# Patient Record
Sex: FEMALE | Race: WHITE | NOT HISPANIC OR LATINO | Employment: OTHER
[De-identification: names, ages, dates, MRNs, and addresses within clinical notes are randomized per-mention and may not be internally consistent; named-entity substitution may affect disease eponyms.]

---

## 2017-08-09 ENCOUNTER — LAB SERVICES (OUTPATIENT)
Dept: OTHER | Age: 67
End: 2017-08-09

## 2017-08-09 ENCOUNTER — HOSPITAL (OUTPATIENT)
Dept: OTHER | Age: 67
End: 2017-08-09
Attending: INTERNAL MEDICINE

## 2017-08-09 ENCOUNTER — IMAGING SERVICES (OUTPATIENT)
Dept: OTHER | Age: 67
End: 2017-08-09

## 2017-08-09 LAB
ALBUMIN SERPL-MCNC: 4.1 G/DL (ref 3.6–5.1)
ALBUMIN SERPL-MCNC: 4.1 GM/DL (ref 3.6–5.1)
ALP SERPL-CCNC: 84 UNIT/L (ref 45–117)
ALP SERPL-CCNC: 84 UNITS/L (ref 45–117)
ALT SERPL-CCNC: 23 UNIT/L
ALT SERPL-CCNC: 23 UNITS/L
AMORPH SED URNS QL MICRO: ABNORMAL
AMORPH SED URNS QL MICRO: ABNORMAL
ANALYZER ANC (IANC): ABNORMAL
ANALYZER ANC (IANC): ABNORMAL
ANION GAP SERPL CALC-SCNC: 14 MMOL/L (ref 10–20)
ANION GAP SERPL CALC-SCNC: 14 MMOL/L (ref 10–20)
APPEARANCE UR: ABNORMAL
APPEARANCE UR: ABNORMAL
AST SERPL-CCNC: 18 UNIT/L
AST SERPL-CCNC: 18 UNITS/L
BACTERIA #/AREA URNS HPF: ABNORMAL /HPF
BACTERIA #/AREA URNS HPF: ABNORMAL /HPF
BASOPHILS # BLD: 0 K/MCL (ref 0–0.3)
BASOPHILS # BLD: 0 THOUSAND/MCL (ref 0–0.3)
BASOPHILS NFR BLD: 0 %
BASOPHILS NFR BLD: 0 %
BILIRUB CONJ SERPL-MCNC: 0.1 MG/DL (ref 0–0.2)
BILIRUB CONJ SERPL-MCNC: 0.1 MG/DL (ref 0–0.2)
BILIRUB SERPL-MCNC: 0.5 MG/DL (ref 0.2–1)
BILIRUB SERPL-MCNC: 0.5 MG/DL (ref 0.2–1)
BILIRUB UR QL: NEGATIVE
BILIRUB UR QL: NEGATIVE
BUN SERPL-MCNC: 15 MG/DL (ref 6–20)
BUN SERPL-MCNC: 15 MG/DL (ref 6–20)
BUN/CREAT SERPL: 18 (ref 7–25)
BUN/CREAT SERPL: 18 (ref 7–25)
CALCIUM SERPL-MCNC: 9.2 MG/DL (ref 8.4–10.2)
CALCIUM SERPL-MCNC: 9.2 MG/DL (ref 8.4–10.2)
CAOX CRY URNS QL MICRO: ABNORMAL
CAOX CRY URNS QL MICRO: ABNORMAL
CHLORIDE SERPL-SCNC: 105 MMOL/L (ref 98–107)
CHLORIDE: 105 MMOL/L (ref 98–107)
CO2 SERPL-SCNC: 27 MMOL/L (ref 21–32)
CO2 SERPL-SCNC: 27 MMOL/L (ref 21–32)
COLOR UR: YELLOW
COLOR UR: YELLOW
CREAT SERPL-MCNC: 0.82 MG/DL (ref 0.51–0.95)
CREAT SERPL-MCNC: 0.82 MG/DL (ref 0.51–0.95)
DIFFERENTIAL METHOD BLD: ABNORMAL
DIFFERENTIAL METHOD BLD: ABNORMAL
EOSINOPHIL # BLD: 0 K/MCL (ref 0.1–0.5)
EOSINOPHIL # BLD: 0 THOUSAND/MCL (ref 0.1–0.5)
EOSINOPHIL NFR BLD: 0 %
EOSINOPHIL NFR BLD: 0 %
EPITH CASTS #/AREA URNS LPF: ABNORMAL
EPITH CASTS #/AREA URNS LPF: ABNORMAL /[LPF]
ERYTHROCYTE [DISTWIDTH] IN BLOOD: 12.5 % (ref 11–15)
ERYTHROCYTE [DISTWIDTH] IN BLOOD: 12.5 % (ref 11–15)
FATTY CASTS #/AREA URNS LPF: ABNORMAL
FATTY CASTS #/AREA URNS LPF: ABNORMAL /[LPF]
GLUCOSE SERPL-MCNC: 149 MG/DL (ref 65–99)
GLUCOSE SERPL-MCNC: 149 MG/DL (ref 65–99)
GLUCOSE UR-MCNC: NEGATIVE MG/DL
GLUCOSE UR-MCNC: NEGATIVE MG/DL
GRAN CASTS #/AREA URNS LPF: ABNORMAL
GRAN CASTS #/AREA URNS LPF: ABNORMAL /[LPF]
HEMATOCRIT: 41.5 % (ref 36–46.5)
HEMATOCRIT: 41.5 % (ref 36–46.5)
HEMOGLOBIN: 14.5 G/DL (ref 12–15.5)
HGB BLD-MCNC: 14.5 GM/DL (ref 12–15.5)
HGB UR QL: NEGATIVE
HYALINE CASTS #/AREA URNS LPF: ABNORMAL /LPF (ref 0–5)
HYALINE CASTS #/AREA URNS LPF: ABNORMAL /LPF (ref 0–5)
KETONES UR-MCNC: 80 MG/DL
KETONES UR-MCNC: 80 MG/DL
LEUKOCYTE ESTERASE UR QL STRIP: NEGATIVE
LIPASE SERPL-CCNC: 876 UNIT/L (ref 73–393)
LIPASE SERPL-CCNC: 876 UNITS/L (ref 73–393)
LYMPHOCYTES # BLD: 0.8 K/MCL (ref 1–4)
LYMPHOCYTES # BLD: 0.8 THOUSAND/MCL (ref 1–4)
LYMPHOCYTES NFR BLD: 9 %
LYMPHOCYTES NFR BLD: 9 %
MCH RBC QN AUTO: 31.3 PG (ref 26–34)
MCHC RBC AUTO-ENTMCNC: 34.9 GM/DL (ref 32–36.5)
MCV RBC AUTO: 89.4 FL (ref 78–100)
MEAN CORPUSCULAR HEMOGLOBIN: 31.3 PG (ref 26–34)
MEAN CORPUSCULAR HGB CONC: 34.9 G/DL (ref 32–36.5)
MEAN CORPUSCULAR VOLUME: 89.4 FL (ref 78–100)
MIXED CELL CASTS #/AREA URNS LPF: ABNORMAL
MIXED CELL CASTS #/AREA URNS LPF: ABNORMAL /[LPF]
MONOCYTES # BLD: 0.2 K/MCL (ref 0.3–0.9)
MONOCYTES # BLD: 0.2 THOUSAND/MCL (ref 0.3–0.9)
MONOCYTES NFR BLD: 3 %
MONOCYTES NFR BLD: 3 %
MUCOUS THREADS URNS QL MICRO: PRESENT
MUCOUS THREADS URNS QL MICRO: PRESENT
NEUTROPHILS # BLD: 8.2 K/MCL (ref 1.8–7.7)
NEUTROPHILS # BLD: 8.2 THOUSAND/MCL (ref 1.8–7.7)
NEUTROPHILS NFR BLD: 88 %
NEUTROPHILS NFR BLD: 88 %
NEUTS SEG NFR BLD: ABNORMAL
NEUTS SEG NFR BLD: ABNORMAL %
NITRITE UR QL: NEGATIVE
NITRITE UR QL: NEGATIVE
NRBC (NRBCRE): ABNORMAL
PERCENT NRBC: ABNORMAL
PH UR: 7 UNIT (ref 5–7)
PH UR: 7 UNITS (ref 5–7)
PLATELET # BLD: 180 THOUSAND/MCL (ref 140–450)
PLATELET COUNT: 180 K/MCL (ref 140–450)
POTASSIUM SERPL-SCNC: 3.9 MMOL/L (ref 3.4–5.1)
POTASSIUM SERPL-SCNC: 3.9 MMOL/L (ref 3.4–5.1)
PROT SERPL-MCNC: 7.2 GM/DL (ref 6.4–8.2)
PROT UR QL: NEGATIVE MG/DL
PROT UR QL: NEGATIVE MG/DL
RBC # BLD: 4.64 MILLION/MCL (ref 4–5.2)
RBC #/AREA URNS HPF: ABNORMAL /HPF (ref 0–3)
RBC #/AREA URNS HPF: ABNORMAL /HPF (ref 0–3)
RBC CASTS #/AREA URNS LPF: ABNORMAL
RBC CASTS #/AREA URNS LPF: ABNORMAL /[LPF]
RBC-URINE: NEGATIVE
RED CELL COUNT: 4.64 MIL/MCL (ref 4–5.2)
RENAL EPI CELLS #/AREA URNS HPF: ABNORMAL
RENAL EPI CELLS #/AREA URNS HPF: ABNORMAL /[HPF]
SODIUM SERPL-SCNC: 142 MMOL/L (ref 135–145)
SODIUM SERPL-SCNC: 142 MMOL/L (ref 135–145)
SP GR UR: 1.02 (ref 1–1.03)
SP GR UR: 1.02 (ref 1–1.03)
SPECIMEN SOURCE: ABNORMAL
SPECIMEN SOURCE: ABNORMAL
SPERM URNS QL MICRO: ABNORMAL
SPERM URNS QL MICRO: ABNORMAL
SQUAMOUS #/AREA URNS HPF: ABNORMAL /HPF (ref 0–5)
SQUAMOUS #/AREA URNS HPF: ABNORMAL /HPF (ref 0–5)
T VAGINALIS URNS QL MICRO: ABNORMAL
T VAGINALIS URNS QL MICRO: ABNORMAL
TOTAL PROTEIN: 7.2 G/DL (ref 6.4–8.2)
TRI-PHOS CRY URNS QL MICRO: ABNORMAL
TRI-PHOS CRY URNS QL MICRO: ABNORMAL
URATE CRY URNS QL MICRO: ABNORMAL
URATE CRY URNS QL MICRO: ABNORMAL
URINE REFLEX: ABNORMAL
URINE REFLEX: ABNORMAL
URNS CMNT MICRO: ABNORMAL
URNS CMNT MICRO: ABNORMAL
UROBILINOGEN UR QL: 0.2 MG/DL (ref 0–1)
UROBILINOGEN UR QL: 0.2 MG/DL (ref 0–1)
WAXY CASTS #/AREA URNS LPF: ABNORMAL
WAXY CASTS #/AREA URNS LPF: ABNORMAL /[LPF]
WBC # BLD: 9.2 THOUSAND/MCL (ref 4.2–11)
WBC #/AREA URNS HPF: ABNORMAL /HPF (ref 0–5)
WBC #/AREA URNS HPF: ABNORMAL /HPF (ref 0–5)
WBC CASTS #/AREA URNS LPF: ABNORMAL
WBC CASTS #/AREA URNS LPF: ABNORMAL /[LPF]
WBC-URINE: NEGATIVE
WHITE BLOOD COUNT: 9.2 K/MCL (ref 4.2–11)
YEAST HYPHAE URNS QL MICRO: ABNORMAL
YEAST HYPHAE URNS QL MICRO: ABNORMAL
YEAST URNS QL MICRO: ABNORMAL
YEAST URNS QL MICRO: ABNORMAL

## 2017-08-10 ENCOUNTER — IMAGING SERVICES (OUTPATIENT)
Dept: OTHER | Age: 67
End: 2017-08-10

## 2017-08-10 LAB
CHOLEST SERPL-MCNC: 195 MG/DL
CHOLEST/HDLC SERPL: 3.1 {RATIO}
HDLC SERPL-MCNC: 62 MG/DL
LDLC SERPL CALC-MCNC: 114 MG/DL
LIPASE SERPL-CCNC: 296 UNIT/L (ref 73–393)
NONHDLC SERPL-MCNC: 133 MG/DL
TRIGLYCERIDE (TRIGP): 96 MG/DL

## 2017-08-11 LAB
ALBUMIN SERPL-MCNC: 3.3 GM/DL (ref 3.6–5.1)
ALBUMIN/GLOB SERPL: 1.2 {RATIO} (ref 1–2.4)
ALP SERPL-CCNC: 65 UNIT/L (ref 45–117)
ALT SERPL-CCNC: 22 UNIT/L
ANION GAP SERPL CALC-SCNC: 14 MMOL/L (ref 10–20)
AST SERPL-CCNC: 16 UNIT/L
BILIRUB SERPL-MCNC: 0.5 MG/DL (ref 0.2–1)
BUN SERPL-MCNC: 7 MG/DL (ref 6–20)
BUN/CREAT SERPL: 9 (ref 7–25)
CALCIUM SERPL-MCNC: 8.4 MG/DL (ref 8.4–10.2)
CHLORIDE: 108 MMOL/L (ref 98–107)
CO2 SERPL-SCNC: 26 MMOL/L (ref 21–32)
CREAT SERPL-MCNC: 0.76 MG/DL (ref 0.51–0.95)
GLOBULIN SER-MCNC: 2.8 GM/DL (ref 2–4)
GLUCOSE SERPL-MCNC: 83 MG/DL (ref 65–99)
POTASSIUM SERPL-SCNC: 3.5 MMOL/L (ref 3.4–5.1)
PROT SERPL-MCNC: 6.1 GM/DL (ref 6.4–8.2)
SODIUM SERPL-SCNC: 144 MMOL/L (ref 135–145)

## 2017-08-29 ENCOUNTER — OFFICE VISIT (OUTPATIENT)
Dept: INTERNAL MEDICINE CLINIC | Facility: CLINIC | Age: 67
End: 2017-08-29

## 2017-08-29 VITALS
SYSTOLIC BLOOD PRESSURE: 131 MMHG | WEIGHT: 125 LBS | BODY MASS INDEX: 22.15 KG/M2 | HEIGHT: 63 IN | DIASTOLIC BLOOD PRESSURE: 77 MMHG | HEART RATE: 96 BPM

## 2017-08-29 DIAGNOSIS — K44.9 HIATAL HERNIA: Primary | ICD-10-CM

## 2017-08-29 DIAGNOSIS — K85.90 ACUTE PANCREATITIS, UNSPECIFIED COMPLICATION STATUS, UNSPECIFIED PANCREATITIS TYPE: ICD-10-CM

## 2017-08-29 PROBLEM — K21.9 GASTROESOPHAGEAL REFLUX DISEASE WITHOUT ESOPHAGITIS: Status: ACTIVE | Noted: 2017-08-29

## 2017-08-29 PROCEDURE — 99203 OFFICE O/P NEW LOW 30 MIN: CPT | Performed by: INTERNAL MEDICINE

## 2017-08-29 PROCEDURE — G0463 HOSPITAL OUTPT CLINIC VISIT: HCPCS | Performed by: INTERNAL MEDICINE

## 2017-08-29 RX ORDER — CITALOPRAM 10 MG/1
0.5 TABLET ORAL DAILY
Refills: 0 | COMMUNITY
Start: 2017-07-14 | End: 2017-08-29

## 2017-08-29 RX ORDER — CITALOPRAM 10 MG/1
TABLET ORAL
Qty: 90 TABLET | Refills: 1 | Status: SHIPPED | OUTPATIENT
Start: 2017-08-29 | End: 2018-05-31

## 2017-08-29 RX ORDER — CITALOPRAM 10 MG/1
5 TABLET ORAL DAILY
Qty: 90 TABLET | Refills: 1 | Status: SHIPPED | OUTPATIENT
Start: 2017-08-29 | End: 2017-08-29

## 2017-08-29 RX ORDER — FAMOTIDINE 40 MG/1
1 TABLET, FILM COATED ORAL 2 TIMES DAILY
Refills: 2 | COMMUNITY
Start: 2017-08-01 | End: 2018-05-22

## 2017-08-29 NOTE — PROGRESS NOTES
HPI:    Patient ID: Kimberly Segura is a 75year old female.   Presents with several concerns As a new patient    HPI  Patient reports that 2 weeks ago she was admitted to [facility] for treatment of recurrent vomiting testing done CT scan of Prescriptions:  famotidine 40 MG Oral Tab Take 1 tablet by mouth 2 (two) times daily.  Disp:  Rfl: 2   Citalopram Hydrobromide 10 MG Oral Tab Take 1 tab po daily Disp: 90 tablet Rfl: 1     Allergies:No Known Allergies   /77 (BP Location: Right arm, Pa of the defined types were placed in this encounter.       Meds This Visit:  Signed Prescriptions Disp Refills    Citalopram Hydrobromide 10 MG Oral Tab 90 tablet 1      Sig: Take 1 tab po daily           Imaging & Referrals:  [facility] - INTERNAL         ID#[id number]

## 2017-11-09 ENCOUNTER — TELEPHONE (OUTPATIENT)
Dept: GASTROENTEROLOGY | Facility: CLINIC | Age: 67
End: 2017-11-09

## 2017-11-09 ENCOUNTER — OFFICE VISIT (OUTPATIENT)
Dept: GASTROENTEROLOGY | Facility: CLINIC | Age: 67
End: 2017-11-09

## 2017-11-09 VITALS
SYSTOLIC BLOOD PRESSURE: 135 MMHG | DIASTOLIC BLOOD PRESSURE: 75 MMHG | WEIGHT: 136 LBS | HEIGHT: 62.75 IN | BODY MASS INDEX: 24.4 KG/M2 | HEART RATE: 83 BPM

## 2017-11-09 DIAGNOSIS — K85.00 IDIOPATHIC ACUTE PANCREATITIS WITHOUT INFECTION OR NECROSIS: ICD-10-CM

## 2017-11-09 DIAGNOSIS — K21.9 GASTROESOPHAGEAL REFLUX DISEASE WITHOUT ESOPHAGITIS: Primary | ICD-10-CM

## 2017-11-09 DIAGNOSIS — K85.00 IDIOPATHIC ACUTE PANCREATITIS, UNSPECIFIED COMPLICATION STATUS: Primary | ICD-10-CM

## 2017-11-09 PROCEDURE — G0463 HOSPITAL OUTPT CLINIC VISIT: HCPCS | Performed by: INTERNAL MEDICINE

## 2017-11-09 PROCEDURE — 99204 OFFICE O/P NEW MOD 45 MIN: CPT | Performed by: INTERNAL MEDICINE

## 2017-11-09 NOTE — TELEPHONE ENCOUNTER
GI RN staff: Please contact the patient to arrange an endoscopic ultrasound of the pancreas by Dr. Douglas Bermudez for an episode of unexplained pancreatitis. Micah Escobedo.

## 2017-11-09 NOTE — PROGRESS NOTES
HPI:    Patient ID: Dimple Jaquez is a 75year old female. HPI  The patient is seen today with her  at the request of Dr. Anca Boateng. She was formally under the GI care of Dr. Justine Wallace.   147 pages of medical records from [facility] dysphagia. She will occasionally awaken with nocturnal acid brash. She has chronic sinus congestion despite having seen ENT and having had a deviated septum repaired. She has throat discomfort as well. She has no current abdominal pain.   Her bowel move Effort normal and breath sounds normal. No respiratory distress. She has no wheezes. She has no rales. Abdominal: Soft. Bowel sounds are normal. She exhibits no distension and no mass. There is no hepatosplenomegaly. There is no tenderness.  There is no r evaluate for these entities. The patient is in agreement. This will be arranged with Dr. Nisha Serrano. 3. Colorectal cancer screening  The patient's last colonoscopy in 2016 was incomplete.   She should have either yearly FIT testing or FIT-DNA testing ev

## 2017-11-09 NOTE — PATIENT INSTRUCTIONS
1.  Continue dietary and lifestyle modification for reflux. 2.  May continue famotidine. 3.  I will have the office contact you to arrange an endoscopic ultrasound to investigate the recent episode of pancreatitis.   4.  You should have either yearly stoo

## 2017-11-09 NOTE — TELEPHONE ENCOUNTER
Noted. Thanks!     Blaise Neri MD  [facility], [facility] - Gastroenterology  11/9/2017  5:16 PM

## 2017-11-14 NOTE — TELEPHONE ENCOUNTER
Scheduled for:  EUS - 98628  Provider Name:  Dr. Jose Lux  Date:  1/16/18  Location:  [facility]  Sedation:  MAC  Time:  1:45 pm (pt is aware to arrive at 12:45 pm)  Prep:  NPO after midnight  Meds/Allergies Reconciled?:  Yes, Physician reviewed    Diagnosis wit

## 2017-11-24 NOTE — TELEPHONE ENCOUNTER
Records reviewed and were summarized in my office visit note. Pertinent findings:    1. CT scan on August 9, 2017 revealed slight peripancreatic stranding which may represent acute pancreatitis. Large hiatal hernia. Colonic diverticulosis.   2.  Tushar Ching

## 2018-01-15 RX ORDER — CITALOPRAM 10 MG/1
10 TABLET ORAL DAILY
COMMUNITY
End: 2018-02-26

## 2018-01-16 ENCOUNTER — SURGERY (OUTPATIENT)
Age: 68
End: 2018-01-16

## 2018-01-16 ENCOUNTER — TELEPHONE (OUTPATIENT)
Dept: GASTROENTEROLOGY | Facility: CLINIC | Age: 68
End: 2018-01-16

## 2018-01-16 ENCOUNTER — HOSPITAL ENCOUNTER (OUTPATIENT)
Facility: HOSPITAL | Age: 68
Setting detail: HOSPITAL OUTPATIENT SURGERY
Discharge: HOME | End: 2018-01-16
Attending: INTERNAL MEDICINE | Admitting: INTERNAL MEDICINE
Payer: MEDICARE

## 2018-01-16 ENCOUNTER — ANESTHESIA EVENT (OUTPATIENT)
Dept: ENDOSCOPY | Facility: HOSPITAL | Age: 68
End: 2018-01-16
Payer: MEDICARE

## 2018-01-16 ENCOUNTER — ANESTHESIA (OUTPATIENT)
Dept: ENDOSCOPY | Facility: HOSPITAL | Age: 68
End: 2018-01-16
Payer: MEDICARE

## 2018-01-16 DIAGNOSIS — K85.00 IDIOPATHIC ACUTE PANCREATITIS, UNSPECIFIED COMPLICATION STATUS: ICD-10-CM

## 2018-01-16 DIAGNOSIS — K44.9 HIATAL HERNIA: Primary | ICD-10-CM

## 2018-01-16 DIAGNOSIS — Z87.19 HISTORY OF PANCREATITIS: Primary | ICD-10-CM

## 2018-01-16 DIAGNOSIS — Q39.6 ESOPHAGEAL DIVERTICULUM: ICD-10-CM

## 2018-01-16 PROCEDURE — 0DJ08ZZ INSPECTION OF UPPER INTESTINAL TRACT, VIA NATURAL OR ARTIFICIAL OPENING ENDOSCOPIC: ICD-10-PCS | Performed by: INTERNAL MEDICINE

## 2018-01-16 PROCEDURE — 43237 ENDOSCOPIC US EXAM ESOPH: CPT | Performed by: INTERNAL MEDICINE

## 2018-01-16 RX ORDER — SODIUM CHLORIDE, SODIUM LACTATE, POTASSIUM CHLORIDE, CALCIUM CHLORIDE 600; 310; 30; 20 MG/100ML; MG/100ML; MG/100ML; MG/100ML
INJECTION, SOLUTION INTRAVENOUS CONTINUOUS
Status: DISCONTINUED | OUTPATIENT
Start: 2018-01-16 | End: 2018-01-16

## 2018-01-16 RX ORDER — NALOXONE HYDROCHLORIDE 0.4 MG/ML
80 INJECTION, SOLUTION INTRAMUSCULAR; INTRAVENOUS; SUBCUTANEOUS AS NEEDED
Status: DISCONTINUED | OUTPATIENT
Start: 2018-01-16 | End: 2018-01-16

## 2018-01-16 RX ORDER — LIDOCAINE HYDROCHLORIDE 10 MG/ML
INJECTION, SOLUTION EPIDURAL; INFILTRATION; INTRACAUDAL; PERINEURAL AS NEEDED
Status: DISCONTINUED | OUTPATIENT
Start: 2018-01-16 | End: 2018-01-16 | Stop reason: SURG

## 2018-01-16 RX ADMIN — SODIUM CHLORIDE, SODIUM LACTATE, POTASSIUM CHLORIDE, CALCIUM CHLORIDE: 600; 310; 30; 20 INJECTION, SOLUTION INTRAVENOUS at 13:51:00

## 2018-01-16 RX ADMIN — LIDOCAINE HYDROCHLORIDE 50 MG: 10 INJECTION, SOLUTION EPIDURAL; INFILTRATION; INTRACAUDAL; PERINEURAL at 13:55:00

## 2018-01-16 RX ADMIN — SODIUM CHLORIDE, SODIUM LACTATE, POTASSIUM CHLORIDE, CALCIUM CHLORIDE: 600; 310; 30; 20 INJECTION, SOLUTION INTRAVENOUS at 14:38:00

## 2018-01-16 NOTE — OPERATIVE REPORT
Esophagogastroduodenoscopy (EGD) & Endoscopic Ultrasound (EUS) Report    Cornelious Starch     3/21/1950 Age 75year old   PCP Andrea Light MD Endoscopist Lillie Leonard MD     Date of procedure: 18    Procedure: EGD and EUS esophagus, stomac normal. There was a small diverticulum at approximately 30 cm. There was some tortuosity of the esophagus. Otherwise, the esophageal mucosa appeared normal.  2. Stomach: The diaphragmatic pinch was located at 41 cm consistent with a 7 cm hiatal hernia.  The at [phone].        Carol Martinez MD  [facility], [facility] - Gastroenterology  1/16/2018  2:39 PM

## 2018-01-16 NOTE — TELEPHONE ENCOUNTER
Pt contacted and reviewed that based on her insurance, she does not require PA and may schedule her MRI by calling [phone], she verbalized understanding and will call them to arrange. No further questions at this time.

## 2018-01-16 NOTE — H&P
History & Physical Examination    Patient Name: Claudette Ales  MRN: [id number]  [facility]: [id number]  YOB: 1950    Diagnosis: history of pancreatitis August 2017 at [facility]      Prescriptions Prior to Admission:  Citalopram Hydrobromide 10 MG

## 2018-01-16 NOTE — TELEPHONE ENCOUNTER
Patient had EGD/EUS today for an episode of unexplained pancreatitis August 2017 at [facility] and recommended EUS which she had today though incomplete evaluation of the pancreas due to anatomy.     Discussed with Dr. Kristofer Joy an

## 2018-01-16 NOTE — ANESTHESIA PREPROCEDURE EVALUATION
Anesthesia PreOp Note    HPI:     Shavon Lala is a 75year old female who presents for preoperative consultation requested by: Lea Givens MD    Date of Surgery: 1/16/2018    Procedure(s):  ENDOSCOPIC ULTRASOUND (EUS)  [facility] labs reviewed. Vital Signs: Body mass index is 22.5 kg/m². height is 1.6 m (5' 3\") and weight is 57.6 kg (127 lb). Her blood pressure is 120/59 and her pulse is 84. Her respiration is 16 and oxygen saturation is 99%.     01/15/18  1896 01/16

## 2018-01-16 NOTE — ANESTHESIA POSTPROCEDURE EVALUATION
Patient: Shavon Main    Procedure Summary     Date:  01/16/18 Room / Location:  [facility] ENDOSCOPY 01 / [facility] ENDOSCOPY    Anesthesia Start:  9032 Anesthesia Stop:  4558    Procedures:       ENDOSCOPIC ULTRASOUND (EUS) (N/A )      ESOPHAGOGASTRODUODENOSCOPY

## 2018-01-17 VITALS
SYSTOLIC BLOOD PRESSURE: 137 MMHG | DIASTOLIC BLOOD PRESSURE: 74 MMHG | HEIGHT: 63 IN | RESPIRATION RATE: 15 BRPM | BODY MASS INDEX: 22.5 KG/M2 | OXYGEN SATURATION: 98 % | WEIGHT: 127 LBS | HEART RATE: 70 BPM

## 2018-01-18 NOTE — TELEPHONE ENCOUNTER
Future Appointments  Date Time Provider Rigo Alejandro   1/26/2018 1:30 PM [facility] MRI RM1 [facility] MRI EM [facility]

## 2018-01-26 ENCOUNTER — HOSPITAL ENCOUNTER (OUTPATIENT)
Dept: MRI IMAGING | Facility: HOSPITAL | Age: 68
Discharge: HOME | End: 2018-01-26
Attending: INTERNAL MEDICINE
Payer: MEDICARE

## 2018-01-26 DIAGNOSIS — Z87.19 HISTORY OF PANCREATITIS: ICD-10-CM

## 2018-01-26 LAB — CREAT BLD-MCNC: 0.8 MG/DL (ref 0.5–1.5)

## 2018-01-26 PROCEDURE — A9575 INJ GADOTERATE MEGLUMI 0.1ML: HCPCS | Performed by: INTERNAL MEDICINE

## 2018-01-26 PROCEDURE — 74183 MRI ABD W/O CNTR FLWD CNTR: CPT | Performed by: INTERNAL MEDICINE

## 2018-01-26 PROCEDURE — 76376 3D RENDER W/INTRP POSTPROCES: CPT | Performed by: INTERNAL MEDICINE

## 2018-01-26 PROCEDURE — 82565 ASSAY OF CREATININE: CPT

## 2018-01-29 ENCOUNTER — TELEPHONE (OUTPATIENT)
Dept: GASTROENTEROLOGY | Facility: CLINIC | Age: 68
End: 2018-01-29

## 2018-01-29 NOTE — TELEPHONE ENCOUNTER
Called patient and discussed with her results of MRI which was unremarkable.  Discussed that based on evaluation by Dr. Tonja Keen and EUS and now MRI her pancreatitis episode she had appears to be idiopathic without anatomical or other explanation identi

## 2018-02-26 RX ORDER — CITALOPRAM HYDROBROMIDE 10 MG/1
TABLET ORAL
Qty: 90 TABLET | Refills: 0 | Status: SHIPPED | OUTPATIENT
Start: 2018-02-26 | End: 2018-03-07

## 2018-03-07 ENCOUNTER — OFFICE VISIT (OUTPATIENT)
Dept: INTERNAL MEDICINE CLINIC | Facility: CLINIC | Age: 68
End: 2018-03-07

## 2018-03-07 VITALS
DIASTOLIC BLOOD PRESSURE: 72 MMHG | SYSTOLIC BLOOD PRESSURE: 135 MMHG | BODY MASS INDEX: 22.5 KG/M2 | WEIGHT: 127 LBS | HEIGHT: 63 IN | HEART RATE: 84 BPM | TEMPERATURE: 98 F

## 2018-03-07 DIAGNOSIS — Z12.31 BREAST CANCER SCREENING BY MAMMOGRAM: ICD-10-CM

## 2018-03-07 DIAGNOSIS — N28.1 KIDNEY CYST, ACQUIRED: ICD-10-CM

## 2018-03-07 DIAGNOSIS — K44.9 HIATAL HERNIA: Primary | ICD-10-CM

## 2018-03-07 DIAGNOSIS — F41.9 ANXIETY: ICD-10-CM

## 2018-03-07 PROCEDURE — G0463 HOSPITAL OUTPT CLINIC VISIT: HCPCS | Performed by: INTERNAL MEDICINE

## 2018-03-07 PROCEDURE — 99213 OFFICE O/P EST LOW 20 MIN: CPT | Performed by: INTERNAL MEDICINE

## 2018-03-07 NOTE — PROGRESS NOTES
HPI:    Patient ID: Shavon Lala is a 75year old female presents for follow-up on several medical conditions. HPI  Patient is here to follow-up on anxiety medication.   She has been taking citalopram 10 mg for about 10 years after the death of her tab po daily (Patient taking differently: 5 mg.  Take 1 tab po daily ) Disp: 90 tablet Rfl: 1     Allergies:  Seasonal                   /72 (BP Location: Right arm, Patient Position: Sitting, Cuff Size: adult)   Pulse 84   Temp 97.9 °F (36.6 °C) (Ora

## 2018-04-04 ENCOUNTER — HOSPITAL (OUTPATIENT)
Dept: OTHER | Age: 68
End: 2018-04-04
Attending: INTERNAL MEDICINE

## 2018-04-13 ENCOUNTER — TELEPHONE (OUTPATIENT)
Dept: INTERNAL MEDICINE CLINIC | Facility: CLINIC | Age: 68
End: 2018-04-13

## 2018-05-10 ENCOUNTER — TELEPHONE (OUTPATIENT)
Dept: INTERNAL MEDICINE CLINIC | Facility: CLINIC | Age: 68
End: 2018-05-10

## 2018-05-10 NOTE — TELEPHONE ENCOUNTER
Pt states has already had a colonoscopy within the year and she can get the results from [facility].

## 2018-05-16 NOTE — TELEPHONE ENCOUNTER
Spoke with pt.   Pt. States to contact Dr. Arthur Escobdeo to request colonoscopy report  At fax [phone],[facility] # [phone]

## 2018-05-21 NOTE — LETTER
06/26/25        Vijaya Shipley  [address]  [place] [address]        [facility],    This is the [facility], office of Erin Chao MD    Thank you for putting your trust in [facility].  Our goal is to deliver the highest quality healthcare and an exceptional patient experience.  Upon reviewing of your medical record shows you are due for the following:        Annual Medicare Physical  Dexa Scan  Blood pressure follow up          Please call [phone] to schedule your appointment or schedule online via [employer].    If you changed to a new provider at another facility, please notify the clinic to update your records.    If you had any recent testing at another facility, please have your results faxed to our office at [phone].      Thank you and have a great day!        
DISPLAY PLAN FREE TEXT

## 2018-05-22 NOTE — TELEPHONE ENCOUNTER
Pharmacy is calling to \"follow up\" on refill request. No communication entered regarding this medication - stated this was initially faxed on 05/17 and then 05/18. Please advise. famotidine 40 MG Oral Tab Take 1 tablet by mouth 2 (two) times daily.  Disp:  Rfl: 2

## 2018-05-23 RX ORDER — FAMOTIDINE 40 MG/1
40 TABLET, FILM COATED ORAL 2 TIMES DAILY
Qty: 180 TABLET | Refills: 0 | Status: SHIPPED | OUTPATIENT
Start: 2018-05-23 | End: 2018-08-22

## 2018-05-31 ENCOUNTER — TELEPHONE (OUTPATIENT)
Dept: OTHER | Age: 68
End: 2018-05-31

## 2018-05-31 ENCOUNTER — OFFICE VISIT (OUTPATIENT)
Dept: INTERNAL MEDICINE CLINIC | Facility: CLINIC | Age: 68
End: 2018-05-31

## 2018-05-31 VITALS
SYSTOLIC BLOOD PRESSURE: 136 MMHG | WEIGHT: 133 LBS | RESPIRATION RATE: 16 BRPM | BODY MASS INDEX: 23.57 KG/M2 | TEMPERATURE: 98 F | DIASTOLIC BLOOD PRESSURE: 80 MMHG | HEART RATE: 75 BPM | HEIGHT: 63 IN

## 2018-05-31 DIAGNOSIS — B02.9 HERPES ZOSTER WITHOUT COMPLICATION: Primary | ICD-10-CM

## 2018-05-31 PROCEDURE — G0463 HOSPITAL OUTPT CLINIC VISIT: HCPCS | Performed by: INTERNAL MEDICINE

## 2018-05-31 PROCEDURE — 99213 OFFICE O/P EST LOW 20 MIN: CPT | Performed by: INTERNAL MEDICINE

## 2018-05-31 RX ORDER — GABAPENTIN 100 MG/1
100 CAPSULE ORAL 2 TIMES DAILY PRN
Qty: 10 CAPSULE | Refills: 0 | Status: SHIPPED | OUTPATIENT
Start: 2018-05-31 | End: 2018-11-27

## 2018-05-31 NOTE — PROGRESS NOTES
Claudette Ales is a 70year old female.   Patient presents with:  Rash: left side      HPI:   Pt comes as an urgent visit   c/c rash  c/o rash left side   Rash x10 days but now hurting   Had shingles when she was pregnant   Pain is   Took 2 tylenols   js aspect of left breast to mid chest wall -- red and crusting over   HEENT: atraumatic, normocephalic  LUNGS: clear to auscultation, no wheeze  CARDIO: RRR without murmur  EXTREMITIES: no edema    ASSESSMENT AND PLAN:   Diagnoses and all orders for this visi

## 2018-08-23 RX ORDER — FAMOTIDINE 40 MG/1
TABLET, FILM COATED ORAL
Qty: 180 TABLET | Refills: 0 | Status: SHIPPED | OUTPATIENT
Start: 2018-08-23 | End: 2018-11-20

## 2018-08-23 NOTE — TELEPHONE ENCOUNTER
Refill Protocol Appointment Criteria  · Appointment scheduled in the past 12 months or in the next 3 months  Recent Outpatient Visits            2 months ago Herpes zoster without complication    Shanda Grant MD

## 2018-11-21 RX ORDER — FAMOTIDINE 40 MG/1
TABLET, FILM COATED ORAL
Qty: 90 TABLET | Refills: 0 | Status: SHIPPED | OUTPATIENT
Start: 2018-11-21 | End: 2018-11-27

## 2018-11-21 NOTE — TELEPHONE ENCOUNTER
Please call patient I refilled her medication Pepcid 40 mg take once a day instead of twice a day ,she is due for follow-up appointment

## 2018-11-27 ENCOUNTER — OFFICE VISIT (OUTPATIENT)
Dept: INTERNAL MEDICINE CLINIC | Facility: CLINIC | Age: 68
End: 2018-11-27
Payer: MEDICARE

## 2018-11-27 ENCOUNTER — LAB ENCOUNTER (OUTPATIENT)
Dept: LAB | Age: 68
End: 2018-11-27
Attending: INTERNAL MEDICINE
Payer: MEDICARE

## 2018-11-27 VITALS
RESPIRATION RATE: 20 BRPM | WEIGHT: 129 LBS | BODY MASS INDEX: 23 KG/M2 | HEART RATE: 82 BPM | DIASTOLIC BLOOD PRESSURE: 82 MMHG | SYSTOLIC BLOOD PRESSURE: 138 MMHG

## 2018-11-27 DIAGNOSIS — J32.8 OTHER CHRONIC SINUSITIS: ICD-10-CM

## 2018-11-27 DIAGNOSIS — K44.9 HIATAL HERNIA: ICD-10-CM

## 2018-11-27 DIAGNOSIS — K21.00 GASTROESOPHAGEAL REFLUX DISEASE WITH ESOPHAGITIS: ICD-10-CM

## 2018-11-27 DIAGNOSIS — K21.00 GASTROESOPHAGEAL REFLUX DISEASE WITH ESOPHAGITIS: Primary | ICD-10-CM

## 2018-11-27 PROCEDURE — 85025 COMPLETE CBC W/AUTO DIFF WBC: CPT

## 2018-11-27 PROCEDURE — 36415 COLL VENOUS BLD VENIPUNCTURE: CPT

## 2018-11-27 PROCEDURE — 80053 COMPREHEN METABOLIC PANEL: CPT

## 2018-11-27 PROCEDURE — G0463 HOSPITAL OUTPT CLINIC VISIT: HCPCS | Performed by: INTERNAL MEDICINE

## 2018-11-27 PROCEDURE — 99214 OFFICE O/P EST MOD 30 MIN: CPT | Performed by: INTERNAL MEDICINE

## 2018-11-27 RX ORDER — FAMOTIDINE 40 MG/1
TABLET, FILM COATED ORAL
Qty: 90 TABLET | Refills: 3 | Status: SHIPPED | OUTPATIENT
Start: 2018-11-27 | End: 2019-12-09

## 2018-11-27 RX ORDER — LEVOCETIRIZINE DIHYDROCHLORIDE 5 MG/1
5 TABLET, FILM COATED ORAL EVERY EVENING
Qty: 90 TABLET | Refills: 0 | Status: SHIPPED | OUTPATIENT
Start: 2018-11-27 | End: 2019-02-12

## 2018-11-27 NOTE — PROGRESS NOTES
HPI:    Patient ID: Lennox Conklin is a 70year old female.   Presents for follow-up on GERD symptoms    HPI  Patient reports that she feels heartburns occasionally, she does have constant chronic postnasal drainage for about 10 years now, it started aft tablet p.o. daily Disp: 90 tablet Rfl: 3   Levocetirizine Dihydrochloride 5 MG Oral Tab Take 1 tablet (5 mg total) by mouth every evening.  Disp: 90 tablet Rfl: 0     Allergies:  Seasonal                   /82 (BP Location: Right arm, Patient Position Prescriptions Disp Refills   • famotidine 40 MG Oral Tab 90 tablet 3     Sig: Take 1 tablet p.o. daily   • Levocetirizine Dihydrochloride 5 MG Oral Tab 90 tablet 0     Sig: Take 1 tablet (5 mg total) by mouth every evening.        Imaging & Referrals:  None

## 2019-01-15 ENCOUNTER — OFFICE VISIT (OUTPATIENT)
Dept: INTERNAL MEDICINE CLINIC | Facility: CLINIC | Age: 69
End: 2019-01-15
Payer: MEDICARE

## 2019-01-15 ENCOUNTER — APPOINTMENT (OUTPATIENT)
Dept: LAB | Age: 69
End: 2019-01-15
Attending: INTERNAL MEDICINE
Payer: MEDICARE

## 2019-01-15 VITALS
HEART RATE: 79 BPM | RESPIRATION RATE: 20 BRPM | SYSTOLIC BLOOD PRESSURE: 129 MMHG | TEMPERATURE: 98 F | BODY MASS INDEX: 23.57 KG/M2 | DIASTOLIC BLOOD PRESSURE: 76 MMHG | HEIGHT: 63 IN | WEIGHT: 133 LBS

## 2019-01-15 DIAGNOSIS — Z00.00 PHYSICAL EXAM, ANNUAL: Primary | ICD-10-CM

## 2019-01-15 DIAGNOSIS — Z12.31 BREAST CANCER SCREENING BY MAMMOGRAM: ICD-10-CM

## 2019-01-15 DIAGNOSIS — E78.00 PURE HYPERCHOLESTEROLEMIA: ICD-10-CM

## 2019-01-15 DIAGNOSIS — K21.9 GASTROESOPHAGEAL REFLUX DISEASE WITHOUT ESOPHAGITIS: ICD-10-CM

## 2019-01-15 DIAGNOSIS — N28.1 KIDNEY CYSTS: ICD-10-CM

## 2019-01-15 DIAGNOSIS — K44.9 HIATAL HERNIA: ICD-10-CM

## 2019-01-15 LAB
CHOLEST SERPL-MCNC: 213 MG/DL (ref 110–200)
HDLC SERPL-MCNC: 51 MG/DL
LDLC SERPL CALC-MCNC: 133 MG/DL (ref 0–99)
NONHDLC SERPL-MCNC: 162 MG/DL
TRIGL SERPL-MCNC: 143 MG/DL (ref 1–149)
TSH SERPL-ACNC: 1.95 UIU/ML (ref 0.45–5.33)

## 2019-01-15 PROCEDURE — 80061 LIPID PANEL: CPT

## 2019-01-15 PROCEDURE — 90670 PCV13 VACCINE IM: CPT | Performed by: INTERNAL MEDICINE

## 2019-01-15 PROCEDURE — 84443 ASSAY THYROID STIM HORMONE: CPT

## 2019-01-15 PROCEDURE — 36415 COLL VENOUS BLD VENIPUNCTURE: CPT

## 2019-01-15 PROCEDURE — G0439 PPPS, SUBSEQ VISIT: HCPCS | Performed by: INTERNAL MEDICINE

## 2019-01-15 PROCEDURE — G0009 ADMIN PNEUMOCOCCAL VACCINE: HCPCS | Performed by: INTERNAL MEDICINE

## 2019-01-15 NOTE — PROGRESS NOTES
HPI:   Shavon Lala is a 70year old female who presents for a Medicare Initial Annual Wellness visit (Once after 12 month Medicare anniversary) .     Patient reports that since 2 months ago she has been taking famotidine 40 mg daily once a day, Xyzal MD as PCP - [facility]    Patient Active Problem List:     Gastroesophageal reflux disease without esophagitis    Wt Readings from Last 3 Encounters:  01/15/19 : 133 lb (60.3 kg)  11/27/18 : 129 lb (58.5 kg)  05/31/18 : 133 lb (60.3 kg)     Last Cholesterol Labs Negative for cough, dyspnea and wheezing.   ENMT:  Negative for ear drainage, hearing loss, oral ulcers  Eyes:  Negative for eye discharge and vision loss  Endocrine:  Negative for polydipsia and polyphagia  Gastrointestinal:  Negative for abdominal pain, c people I talk to seem to mumble (or don't speak clearly):  Sometimes People get annoyed because I misunderstand what they say:  No   I misunderstand what others are saying and make inappropriate responses:  No I avoid social activities because I cannot hea PLAN SUMMARY:   Diagnoses and all orders for this visit:    Physical exam, annual patient will continue healthy diet, regular physical activities encouraged, pediatric lab testS, patient will get vaccinated with 2 pneumonia vaccines, will consider gett years Colonoscopy due on 03/21/1950 Update [facility] if applicable    Flex Sigmoidoscopy Screen every 10 years No results found for this or any previous visit. No flowsheet data found.      Fecal Occult Blood Annually No results found for: FOB No f Part B No vaccine history found This may be covered with your pharmacy  prescription benefits         Template: VERO NUNEZ MEDICARE ANNUAL ASSESSMENT FEMALE [63587]          Meds This Visit:

## 2019-01-28 ENCOUNTER — TELEPHONE (OUTPATIENT)
Dept: INTERNAL MEDICINE CLINIC | Facility: CLINIC | Age: 69
End: 2019-01-28

## 2019-01-28 NOTE — TELEPHONE ENCOUNTER
Patient returned call, advised of result notes below per Dr Christy Cooper, patient verbalized understanding and agreed. Also noted is aware of tests ordered by Dr Christy Cooper, she will be scheduling to complete soon. No other concerns at this time.       Notes recorded by [facility]

## 2019-02-08 ENCOUNTER — HOSPITAL ENCOUNTER (OUTPATIENT)
Dept: ULTRASOUND IMAGING | Age: 69
Discharge: HOME | End: 2019-02-08
Attending: INTERNAL MEDICINE
Payer: MEDICARE

## 2019-02-08 DIAGNOSIS — N28.1 KIDNEY CYSTS: ICD-10-CM

## 2019-02-08 PROCEDURE — 76775 US EXAM ABDO BACK WALL LIM: CPT | Performed by: INTERNAL MEDICINE

## 2019-02-12 RX ORDER — LEVOCETIRIZINE DIHYDROCHLORIDE 5 MG/1
TABLET, FILM COATED ORAL
Qty: 90 TABLET | Refills: 0 | Status: SHIPPED | OUTPATIENT
Start: 2019-02-12 | End: 2019-05-07

## 2019-04-16 ENCOUNTER — HOSPITAL (OUTPATIENT)
Dept: OTHER | Age: 69
End: 2019-04-16
Attending: INTERNAL MEDICINE

## 2019-04-19 ENCOUNTER — TELEPHONE (OUTPATIENT)
Dept: INTERNAL MEDICINE CLINIC | Facility: CLINIC | Age: 69
End: 2019-04-19

## 2019-05-07 RX ORDER — LEVOCETIRIZINE DIHYDROCHLORIDE 5 MG/1
TABLET, FILM COATED ORAL
Qty: 90 TABLET | Refills: 1 | Status: SHIPPED | OUTPATIENT
Start: 2019-05-07 | End: 2019-10-27

## 2019-07-24 ENCOUNTER — OFFICE VISIT (OUTPATIENT)
Dept: INTERNAL MEDICINE CLINIC | Facility: CLINIC | Age: 69
End: 2019-07-24
Payer: MEDICARE

## 2019-07-24 VITALS
RESPIRATION RATE: 18 BRPM | SYSTOLIC BLOOD PRESSURE: 127 MMHG | HEART RATE: 76 BPM | DIASTOLIC BLOOD PRESSURE: 70 MMHG | WEIGHT: 135 LBS | BODY MASS INDEX: 24 KG/M2

## 2019-07-24 DIAGNOSIS — R09.89 BRUIT: Primary | ICD-10-CM

## 2019-07-24 DIAGNOSIS — J30.89 NON-SEASONAL ALLERGIC RHINITIS DUE TO OTHER ALLERGIC TRIGGER: ICD-10-CM

## 2019-07-24 PROBLEM — J30.1 NON-SEASONAL ALLERGIC RHINITIS DUE TO POLLEN: Status: ACTIVE | Noted: 2019-07-24

## 2019-07-24 PROCEDURE — 99214 OFFICE O/P EST MOD 30 MIN: CPT | Performed by: INTERNAL MEDICINE

## 2019-07-24 PROCEDURE — G0463 HOSPITAL OUTPT CLINIC VISIT: HCPCS | Performed by: INTERNAL MEDICINE

## 2019-07-24 RX ORDER — MONTELUKAST SODIUM 10 MG/1
10 TABLET ORAL NIGHTLY
Qty: 90 TABLET | Refills: 0 | Status: SHIPPED | OUTPATIENT
Start: 2019-07-24 | End: 2019-11-20

## 2019-07-24 NOTE — PROGRESS NOTES
HPI:    Patient ID: Henny Rubio is a 78year old female.   Presents for follow-up allergies GERD, allergic rhinitis    HPI  Patient reports that since she started taking Xyzal she feels some improvement, still gets congested in her nose at night, but and reactive to light. Conjunctivae and EOM are normal. No scleral icterus. Neck: Normal range of motion. Neck supple. No JVD present. Cardiovascular: Normal rate, regular rhythm, S1 normal and S2 normal.  Exam reveals no gallop. No murmur heard.   P

## 2019-08-22 ENCOUNTER — HOSPITAL ENCOUNTER (OUTPATIENT)
Dept: ULTRASOUND IMAGING | Facility: HOSPITAL | Age: 69
Discharge: HOME | End: 2019-08-22
Attending: INTERNAL MEDICINE
Payer: MEDICARE

## 2019-08-22 DIAGNOSIS — R09.89 BRUIT: ICD-10-CM

## 2019-08-22 PROCEDURE — 93880 EXTRACRANIAL BILAT STUDY: CPT | Performed by: INTERNAL MEDICINE

## 2019-08-28 ENCOUNTER — TELEPHONE (OUTPATIENT)
Dept: INTERNAL MEDICINE CLINIC | Facility: CLINIC | Age: 69
End: 2019-08-28

## 2019-08-28 NOTE — TELEPHONE ENCOUNTER
Pt was informed of message below and she verbalized understanding. Thanks  Notes recorded by Sindi Hines on 8/23/2019 at 11:54 AM CDT  Called pt. NO answer.  Letter mailed to pt.  ------    Notes recorded by Carly Cody MD on 8/22/2019 at 9:17 PM CDT  Ca

## 2019-10-29 RX ORDER — LEVOCETIRIZINE DIHYDROCHLORIDE 5 MG/1
TABLET, FILM COATED ORAL
Qty: 90 TABLET | Refills: 1 | Status: SHIPPED | OUTPATIENT
Start: 2019-10-29 | End: 2020-04-25

## 2019-10-29 NOTE — TELEPHONE ENCOUNTER
Refill passed per [facility] protocol.   Hypertensive Medications  Protocol Criteria:  · Appointment scheduled in the past 6 months or in the next 3 months  · BMP or CMP in the past 12 months  · Creatinine result < 2  Recent Outpatient Visits

## 2019-11-20 ENCOUNTER — OFFICE VISIT (OUTPATIENT)
Dept: INTERNAL MEDICINE CLINIC | Facility: CLINIC | Age: 69
End: 2019-11-20
Payer: MEDICARE

## 2019-11-20 VITALS
WEIGHT: 137 LBS | SYSTOLIC BLOOD PRESSURE: 143 MMHG | RESPIRATION RATE: 22 BRPM | BODY MASS INDEX: 24 KG/M2 | HEART RATE: 91 BPM | DIASTOLIC BLOOD PRESSURE: 80 MMHG

## 2019-11-20 DIAGNOSIS — K21.00 GASTROESOPHAGEAL REFLUX DISEASE WITH ESOPHAGITIS: Primary | ICD-10-CM

## 2019-11-20 PROCEDURE — 99213 OFFICE O/P EST LOW 20 MIN: CPT | Performed by: INTERNAL MEDICINE

## 2019-11-20 PROCEDURE — G0463 HOSPITAL OUTPT CLINIC VISIT: HCPCS | Performed by: INTERNAL MEDICINE

## 2019-11-22 NOTE — PROGRESS NOTES
HPI:    Patient ID: Henny Rubio is a 78year old female.   Presents for follow-up on GERD    HPI  Patient reports that overall she has been doing well, she has gained weight, states that eating more than she should, heartburns under control as long as distension. There is no hepatosplenomegaly. There is no tenderness. There is no rebound and no guarding. Musculoskeletal: Normal range of motion. Lymphadenopathy:     She has no cervical adenopathy.    Neurological: She is alert and oriented to person,

## 2019-12-09 RX ORDER — FAMOTIDINE 40 MG/1
TABLET, FILM COATED ORAL
Qty: 90 TABLET | Refills: 1 | Status: SHIPPED | OUTPATIENT
Start: 2019-12-09 | End: 2020-06-01

## 2019-12-09 NOTE — TELEPHONE ENCOUNTER
Fax received requesting refill.      Current Outpatient Medications   Medication Sig Dispense Refill   • famotidine 40 MG Oral Tab Take 1 tablet p.o. daily 90 tablet 3

## 2019-12-09 NOTE — TELEPHONE ENCOUNTER
Refill passed per [facility] protocol.   Refill Protocol Appointment Criteria  · Appointment scheduled in the past 12 months or in the next 3 months  Recent Outpatient Visits            2 weeks ago Gastroesophageal reflux disease with esophagitis    El

## 2020-04-25 RX ORDER — LEVOCETIRIZINE DIHYDROCHLORIDE 5 MG/1
TABLET, FILM COATED ORAL
Qty: 90 TABLET | Refills: 1 | Status: SHIPPED | OUTPATIENT
Start: 2020-04-25 | End: 2020-11-25

## 2020-06-01 RX ORDER — FAMOTIDINE 40 MG/1
TABLET, FILM COATED ORAL
Qty: 90 TABLET | Refills: 1 | Status: SHIPPED | OUTPATIENT
Start: 2020-06-01 | End: 2020-11-25

## 2020-11-25 RX ORDER — FAMOTIDINE 40 MG/1
TABLET, FILM COATED ORAL
Qty: 90 TABLET | Refills: 1 | Status: SHIPPED | OUTPATIENT
Start: 2020-11-25 | End: 2021-04-13

## 2020-11-25 RX ORDER — LEVOCETIRIZINE DIHYDROCHLORIDE 5 MG/1
TABLET, FILM COATED ORAL
Qty: 90 TABLET | Refills: 1 | Status: SHIPPED | OUTPATIENT
Start: 2020-11-25 | End: 2021-07-10

## 2020-12-18 ENCOUNTER — NURSE TRIAGE (OUTPATIENT)
Dept: INTERNAL MEDICINE CLINIC | Facility: CLINIC | Age: 70
End: 2020-12-18

## 2020-12-18 DIAGNOSIS — N39.0 URINARY TRACT INFECTION WITHOUT HEMATURIA, SITE UNSPECIFIED: Primary | ICD-10-CM

## 2020-12-18 RX ORDER — NITROFURANTOIN 25; 75 MG/1; MG/1
100 CAPSULE ORAL EVERY 12 HOURS
Qty: 14 CAPSULE | Refills: 0 | Status: SHIPPED | OUTPATIENT
Start: 2020-12-18 | End: 2021-02-11 | Stop reason: ALTCHOICE

## 2020-12-18 NOTE — TELEPHONE ENCOUNTER
Action Requested: Summary for Provider     []  Critical Lab, Recommendations Needed  [x] Need Additional Advice  []   FYI    []   Need Orders  [] Need Medications Sent to Pharmacy  []  Other     SUMMARY: Patient reports UTI symptoms x 3 days--burning, clou

## 2020-12-18 NOTE — TELEPHONE ENCOUNTER
Spoke with patient, (  verified ) informed of DR. Chao's  instructions below     Provided information to call Central scheduling at [phone] to make an appt   \Patient verbalizes understanding and agrees.        RX sent as ordered

## 2020-12-19 ENCOUNTER — LAB ENCOUNTER (OUTPATIENT)
Dept: LAB | Age: 70
End: 2020-12-19
Attending: INTERNAL MEDICINE
Payer: MEDICARE

## 2020-12-19 PROCEDURE — 81015 MICROSCOPIC EXAM OF URINE: CPT | Performed by: INTERNAL MEDICINE

## 2021-02-11 ENCOUNTER — TELEPHONE (OUTPATIENT)
Dept: INTERNAL MEDICINE CLINIC | Facility: CLINIC | Age: 71
End: 2021-02-11

## 2021-02-11 ENCOUNTER — OFFICE VISIT (OUTPATIENT)
Dept: FAMILY MEDICINE CLINIC | Facility: CLINIC | Age: 71
End: 2021-02-11
Payer: MEDICARE

## 2021-02-11 VITALS
BODY MASS INDEX: 23.57 KG/M2 | WEIGHT: 133 LBS | DIASTOLIC BLOOD PRESSURE: 79 MMHG | SYSTOLIC BLOOD PRESSURE: 140 MMHG | HEART RATE: 96 BPM | HEIGHT: 63 IN

## 2021-02-11 DIAGNOSIS — R03.0 ELEVATED BLOOD PRESSURE READING IN OFFICE WITHOUT DIAGNOSIS OF HYPERTENSION: ICD-10-CM

## 2021-02-11 DIAGNOSIS — R30.0 DYSURIA: Primary | ICD-10-CM

## 2021-02-11 LAB
APPEARANCE: CLEAR
BILIRUBIN: NEGATIVE
GLUCOSE (URINE DIPSTICK): NEGATIVE MG/DL
KETONES (URINE DIPSTICK): NEGATIVE MG/DL
MULTISTIX LOT#: 1044 NUMERIC
NITRITE, URINE: NEGATIVE
OCCULT BLOOD: NEGATIVE
PH, URINE: 6.5 (ref 4.5–8)
PROTEIN (URINE DIPSTICK): NEGATIVE MG/DL
SPECIFIC GRAVITY: 1 (ref 1–1.03)
URINE-COLOR: YELLOW
UROBILINOGEN,SEMI-QN: 0.2 MG/DL (ref 0–1.9)

## 2021-02-11 PROCEDURE — 81002 URINALYSIS NONAUTO W/O SCOPE: CPT | Performed by: PHYSICIAN ASSISTANT

## 2021-02-11 PROCEDURE — 99203 OFFICE O/P NEW LOW 30 MIN: CPT | Performed by: PHYSICIAN ASSISTANT

## 2021-02-11 RX ORDER — NITROFURANTOIN 25; 75 MG/1; MG/1
100 CAPSULE ORAL 2 TIMES DAILY
Qty: 14 CAPSULE | Refills: 0 | Status: SHIPPED | OUTPATIENT
Start: 2021-02-11 | End: 2021-02-26

## 2021-02-11 NOTE — PROGRESS NOTES
HPI:     Dysuria   This is a new problem. Episode onset: 1 month. The problem occurs intermittently. The problem has been waxing and waning. The quality of the pain is described as burning and aching. There has been no fever. She is sexually active.  There cervical   • Crohn's Disease Daughter        Social History:   Social History    Socioeconomic History      Marital status:        Spouse name: Not on file      Number of children: Not on file      Years of education: Not on file      Highest educati constipation, blood in stool and abdominal distention. Genitourinary: Positive for dysuria. Negative for frequency, hematuria and flank pain. Skin: Negative for rash. Neurological: Negative for dizziness, syncope, numbness and headaches.         02/11 PO BID for 7 days. · Complete full course of antibiotics. Will send the urine for culture if needed and we will call with results. · Over the counter Tylenol/Motrin as needed for pain/discomfort.   · Follow up in 3-4 days if symptoms do not improve or wo

## 2021-02-11 NOTE — TELEPHONE ENCOUNTER
Patient triaged on 12/18/20 for Urinary symptoms and was prescribed Macrobid. Per patient, the medication helped however, she is now experiencing the same symptoms. Dysuria without hematuria, frequency.  Per patient she is \"miserable and has been soaking i

## 2021-02-26 ENCOUNTER — LABORATORY ENCOUNTER (OUTPATIENT)
Dept: LAB | Age: 71
End: 2021-02-26
Attending: INTERNAL MEDICINE
Payer: MEDICARE

## 2021-02-26 ENCOUNTER — OFFICE VISIT (OUTPATIENT)
Dept: INTERNAL MEDICINE CLINIC | Facility: CLINIC | Age: 71
End: 2021-02-26
Payer: MEDICARE

## 2021-02-26 VITALS
HEART RATE: 93 BPM | DIASTOLIC BLOOD PRESSURE: 87 MMHG | TEMPERATURE: 97 F | WEIGHT: 133.38 LBS | BODY MASS INDEX: 23.63 KG/M2 | SYSTOLIC BLOOD PRESSURE: 156 MMHG | HEIGHT: 63 IN

## 2021-02-26 DIAGNOSIS — Z12.31 ENCOUNTER FOR SCREENING MAMMOGRAM FOR BREAST CANCER: ICD-10-CM

## 2021-02-26 DIAGNOSIS — Z85.43 HISTORY OF OVARIAN CANCER: Primary | ICD-10-CM

## 2021-02-26 DIAGNOSIS — Z85.43 HISTORY OF OVARIAN CANCER: ICD-10-CM

## 2021-02-26 DIAGNOSIS — K21.9 GASTROESOPHAGEAL REFLUX DISEASE WITHOUT ESOPHAGITIS: ICD-10-CM

## 2021-02-26 DIAGNOSIS — J30.89 NON-SEASONAL ALLERGIC RHINITIS DUE TO OTHER ALLERGIC TRIGGER: ICD-10-CM

## 2021-02-26 LAB
ALBUMIN SERPL-MCNC: 3.9 G/DL (ref 3.4–5)
ALBUMIN/GLOB SERPL: 1.2 {RATIO} (ref 1–2)
ALP LIVER SERPL-CCNC: 89 U/L
ALT SERPL-CCNC: 22 U/L
ANION GAP SERPL CALC-SCNC: 3 MMOL/L (ref 0–18)
AST SERPL-CCNC: 16 U/L (ref 15–37)
BASOPHILS # BLD AUTO: 0.07 X10(3) UL (ref 0–0.2)
BASOPHILS NFR BLD AUTO: 0.9 %
BILIRUB SERPL-MCNC: 0.3 MG/DL (ref 0.1–2)
BUN BLD-MCNC: 18 MG/DL (ref 7–18)
BUN/CREAT SERPL: 20 (ref 10–20)
CALCIUM BLD-MCNC: 9.2 MG/DL (ref 8.5–10.1)
CANCER AG125 SERPL-ACNC: 5.7 U/ML (ref ?–35)
CHLORIDE SERPL-SCNC: 107 MMOL/L (ref 98–112)
CO2 SERPL-SCNC: 29 MMOL/L (ref 21–32)
CREAT BLD-MCNC: 0.9 MG/DL
DEPRECATED RDW RBC AUTO: 42.2 FL (ref 35.1–46.3)
EOSINOPHIL # BLD AUTO: 0.15 X10(3) UL (ref 0–0.7)
EOSINOPHIL NFR BLD AUTO: 2 %
ERYTHROCYTE [DISTWIDTH] IN BLOOD BY AUTOMATED COUNT: 12.6 % (ref 11–15)
GLOBULIN PLAS-MCNC: 3.3 G/DL (ref 2.8–4.4)
GLUCOSE BLD-MCNC: 91 MG/DL (ref 70–99)
HCT VFR BLD AUTO: 42.3 %
HGB BLD-MCNC: 14.2 G/DL
IMM GRANULOCYTES # BLD AUTO: 0.02 X10(3) UL (ref 0–1)
IMM GRANULOCYTES NFR BLD: 0.3 %
LYMPHOCYTES # BLD AUTO: 2.31 X10(3) UL (ref 1–4)
LYMPHOCYTES NFR BLD AUTO: 30.3 %
M PROTEIN MFR SERPL ELPH: 7.2 G/DL (ref 6.4–8.2)
MCH RBC QN AUTO: 30.8 PG (ref 26–34)
MCHC RBC AUTO-ENTMCNC: 33.6 G/DL (ref 31–37)
MCV RBC AUTO: 91.8 FL
MONOCYTES # BLD AUTO: 0.5 X10(3) UL (ref 0.1–1)
MONOCYTES NFR BLD AUTO: 6.6 %
NEUTROPHILS # BLD AUTO: 4.57 X10 (3) UL (ref 1.5–7.7)
NEUTROPHILS # BLD AUTO: 4.57 X10(3) UL (ref 1.5–7.7)
NEUTROPHILS NFR BLD AUTO: 59.9 %
OSMOLALITY SERPL CALC.SUM OF ELEC: 289 MOSM/KG (ref 275–295)
PATIENT FASTING Y/N/NP: NO
PLATELET # BLD AUTO: 209 10(3)UL (ref 150–450)
POTASSIUM SERPL-SCNC: 4.2 MMOL/L (ref 3.5–5.1)
RBC # BLD AUTO: 4.61 X10(6)UL
SODIUM SERPL-SCNC: 139 MMOL/L (ref 136–145)
WBC # BLD AUTO: 7.6 X10(3) UL (ref 4–11)

## 2021-02-26 PROCEDURE — 85025 COMPLETE CBC W/AUTO DIFF WBC: CPT

## 2021-02-26 PROCEDURE — G0439 PPPS, SUBSEQ VISIT: HCPCS | Performed by: INTERNAL MEDICINE

## 2021-02-26 PROCEDURE — 80053 COMPREHEN METABOLIC PANEL: CPT

## 2021-02-26 PROCEDURE — 86304 IMMUNOASSAY TUMOR CA 125: CPT

## 2021-02-26 PROCEDURE — 36415 COLL VENOUS BLD VENIPUNCTURE: CPT

## 2021-02-26 NOTE — PROGRESS NOTES
HPI:   Jaron Franklin is a 69year old female who presents for a Medicare Subsequent Annual Wellness visit (Pt already had Initial Annual Wellness).     Overall patient has been feeling well, she had several episodes of frequency of urination and suprapu Bridger Storm was screened for Alcohol abuse and had a score of 0 so is at low risk.     Patient Care Team: Patient Care Team:  Héctor Cedeño MD as PCP - General (Internal Medicine)  Héctor Cedeño MD as PCP - [facility]    Patient Active Problem List: decreased activity, fever, irritability and lethargy  Cardiovascular:  Negative for chest pain and irregular heartbeat/palpitations  Respiratory:  Negative for cough, dyspnea and wheezing.   Eyes:  Negative for eye discharge and vision loss  Endocrine:  Neg well and fear I will reply improperly: No   Family members and friends have told me they think I may have hearing loss: Yes                Visual Acuity                           Physical Exam    Constitutional: She is oriented to person, place, and time. ([id number]);  Future    Gastroesophageal reflux disease without esophagitis  Patient will continue GERD diet, continue Pepcid  Non-seasonal allergic rhinitis due to other allergic trigger         Diet assessment: good     PLAN:  The patient indicates unders Pelvic      Pap: Every 3 yrs age 21-68 or Pap+HPV every 5 yrs age 33-67, age 72 and older at high risk There are no preventive care reminders to display for this patient.  Update Health Maintenance if applicable    Chlamydia  Annually if high risk No result

## 2021-03-04 ENCOUNTER — HOSPITAL ENCOUNTER (OUTPATIENT)
Dept: MAMMOGRAPHY | Age: 71
Discharge: HOME | End: 2021-03-04
Attending: INTERNAL MEDICINE

## 2021-03-04 DIAGNOSIS — Z12.31 ENCOUNTER FOR SCREENING MAMMOGRAM FOR MALIGNANT NEOPLASM OF BREAST: ICD-10-CM

## 2021-03-04 PROCEDURE — 77063 BREAST TOMOSYNTHESIS BI: CPT

## 2021-03-15 DIAGNOSIS — Z23 NEED FOR VACCINATION: ICD-10-CM

## 2021-03-31 ENCOUNTER — TELEPHONE (OUTPATIENT)
Dept: INTERNAL MEDICINE CLINIC | Facility: CLINIC | Age: 71
End: 2021-03-31

## 2021-03-31 NOTE — TELEPHONE ENCOUNTER
Reached out to patient to schedule mammogram from order  2/26/21 showing not completed yet. Patient states she completed mammogram around 3/1/21 through [facility] but has not received results yet.  Patient states she will call them and re

## 2021-04-02 ENCOUNTER — TELEPHONE (OUTPATIENT)
Dept: FAMILY MEDICINE CLINIC | Facility: CLINIC | Age: 71
End: 2021-04-02

## 2021-04-02 NOTE — TELEPHONE ENCOUNTER
Received Mammogram Results from [facility] Nguyen, per Dr. Guru Bloom mammogram, repeat in 1 year\". Spoke w/patient ( & Name verified) and informed on normal results. Patient verbalized understanding.

## 2021-04-13 RX ORDER — FAMOTIDINE 40 MG/1
TABLET, FILM COATED ORAL
Qty: 90 TABLET | Refills: 1 | Status: SHIPPED | OUTPATIENT
Start: 2021-04-13 | End: 2021-10-25

## 2021-07-10 RX ORDER — LEVOCETIRIZINE DIHYDROCHLORIDE 5 MG/1
TABLET, FILM COATED ORAL
Qty: 90 TABLET | Refills: 1 | Status: SHIPPED | OUTPATIENT
Start: 2021-07-10 | End: 2022-01-03

## 2021-10-25 RX ORDER — FAMOTIDINE 40 MG/1
TABLET, FILM COATED ORAL
Qty: 90 TABLET | Refills: 1 | Status: SHIPPED | OUTPATIENT
Start: 2021-10-25 | End: 2022-04-19

## 2021-10-25 NOTE — TELEPHONE ENCOUNTER
Refill passed per [facility] protocol.   Requested Prescriptions   Pending Prescriptions Disp Refills    FAMOTIDINE 40 MG Oral Tab [Pharmacy Med Name: FAMOTIDINE 40 MG TABLET] 90 tablet 1     Sig: TAKE 1 TABLET BY MOUTH EVERY DAY        Gastrointestional Medication Protocol Passed - 10/25/2021 12:12 AM        Passed - Appointment in past 12 or next 3 months               Recent Outpatient Visits              8 months ago History of ovarian cancer    Leah Larose [place], [place]    Office Visit    8 months ago [address] [place] [address] Yuli Yepez PA-C    Office Visit    1 year ago Gastroesophageal reflux disease with esophagitis    [facility], [place] Jeraldine Evans, MD    Office Visit    2 years ago [facility], [address] [place] [address], Mary Ash MD    Office Visit    2 years ago Physical exam, annual    Joshua Shore, Lashawn Hinojosa MD    Office Visit

## 2022-01-03 RX ORDER — LEVOCETIRIZINE DIHYDROCHLORIDE 5 MG/1
TABLET, FILM COATED ORAL
Qty: 90 TABLET | Refills: 1 | Status: SHIPPED | OUTPATIENT
Start: 2022-01-03

## 2022-03-16 ENCOUNTER — LAB ENCOUNTER (OUTPATIENT)
Dept: LAB | Age: 72
End: 2022-03-16
Attending: INTERNAL MEDICINE
Payer: MEDICARE

## 2022-03-16 ENCOUNTER — OFFICE VISIT (OUTPATIENT)
Dept: INTERNAL MEDICINE CLINIC | Facility: CLINIC | Age: 72
End: 2022-03-16
Payer: MEDICARE

## 2022-03-16 VITALS
SYSTOLIC BLOOD PRESSURE: 162 MMHG | HEIGHT: 63 IN | BODY MASS INDEX: 23.39 KG/M2 | WEIGHT: 132 LBS | DIASTOLIC BLOOD PRESSURE: 88 MMHG | HEART RATE: 98 BPM

## 2022-03-16 DIAGNOSIS — E78.49 OTHER HYPERLIPIDEMIA: ICD-10-CM

## 2022-03-16 DIAGNOSIS — Z12.31 BREAST CANCER SCREENING BY MAMMOGRAM: ICD-10-CM

## 2022-03-16 DIAGNOSIS — Z00.00 PHYSICAL EXAM, ANNUAL: Primary | ICD-10-CM

## 2022-03-16 DIAGNOSIS — N28.1 CYST OF RIGHT KIDNEY: ICD-10-CM

## 2022-03-16 DIAGNOSIS — J30.1 NON-SEASONAL ALLERGIC RHINITIS DUE TO POLLEN: ICD-10-CM

## 2022-03-16 DIAGNOSIS — K21.9 GASTROESOPHAGEAL REFLUX DISEASE WITHOUT ESOPHAGITIS: ICD-10-CM

## 2022-03-16 LAB
ALBUMIN SERPL-MCNC: 4.2 G/DL (ref 3.4–5)
ALBUMIN/GLOB SERPL: 1.4 {RATIO} (ref 1–2)
ALP LIVER SERPL-CCNC: 91 U/L
ALT SERPL-CCNC: 24 U/L
ANION GAP SERPL CALC-SCNC: 4 MMOL/L (ref 0–18)
AST SERPL-CCNC: 20 U/L (ref 15–37)
BASOPHILS # BLD AUTO: 0.1 X10(3) UL (ref 0–0.2)
BASOPHILS NFR BLD AUTO: 1.2 %
BILIRUB SERPL-MCNC: 0.5 MG/DL (ref 0.1–2)
BUN BLD-MCNC: 15 MG/DL (ref 7–18)
BUN/CREAT SERPL: 15.3 (ref 10–20)
CALCIUM BLD-MCNC: 9.3 MG/DL (ref 8.5–10.1)
CHLORIDE SERPL-SCNC: 106 MMOL/L (ref 98–112)
CHOLEST SERPL-MCNC: 235 MG/DL (ref ?–200)
CO2 SERPL-SCNC: 28 MMOL/L (ref 21–32)
CREAT BLD-MCNC: 0.98 MG/DL
DEPRECATED RDW RBC AUTO: 43.2 FL (ref 35.1–46.3)
EOSINOPHIL # BLD AUTO: 0.14 X10(3) UL (ref 0–0.7)
EOSINOPHIL NFR BLD AUTO: 1.7 %
ERYTHROCYTE [DISTWIDTH] IN BLOOD BY AUTOMATED COUNT: 12.9 % (ref 11–15)
FASTING PATIENT LIPID ANSWER: YES
FASTING STATUS PATIENT QL REPORTED: YES
GLOBULIN PLAS-MCNC: 2.9 G/DL (ref 2.8–4.4)
GLUCOSE BLD-MCNC: 95 MG/DL (ref 70–99)
HCT VFR BLD AUTO: 44.7 %
HDLC SERPL-MCNC: 60 MG/DL (ref 40–59)
HGB BLD-MCNC: 14.7 G/DL
IMM GRANULOCYTES # BLD AUTO: 0.02 X10(3) UL (ref 0–1)
IMM GRANULOCYTES NFR BLD: 0.2 %
LDLC SERPL CALC-MCNC: 145 MG/DL (ref ?–100)
LYMPHOCYTES # BLD AUTO: 3.29 X10(3) UL (ref 1–4)
LYMPHOCYTES NFR BLD AUTO: 40.8 %
MCH RBC QN AUTO: 30.2 PG (ref 26–34)
MCHC RBC AUTO-ENTMCNC: 32.9 G/DL (ref 31–37)
MCV RBC AUTO: 91.8 FL
MONOCYTES # BLD AUTO: 0.38 X10(3) UL (ref 0.1–1)
MONOCYTES NFR BLD AUTO: 4.7 %
NEUTROPHILS # BLD AUTO: 4.13 X10 (3) UL (ref 1.5–7.7)
NEUTROPHILS # BLD AUTO: 4.13 X10(3) UL (ref 1.5–7.7)
NEUTROPHILS NFR BLD AUTO: 51.4 %
NONHDLC SERPL-MCNC: 175 MG/DL (ref ?–130)
OSMOLALITY SERPL CALC.SUM OF ELEC: 287 MOSM/KG (ref 275–295)
PLATELET # BLD AUTO: 203 10(3)UL (ref 150–450)
POTASSIUM SERPL-SCNC: 4.1 MMOL/L (ref 3.5–5.1)
PROT SERPL-MCNC: 7.1 G/DL (ref 6.4–8.2)
RBC # BLD AUTO: 4.87 X10(6)UL
SODIUM SERPL-SCNC: 138 MMOL/L (ref 136–145)
TRIGL SERPL-MCNC: 169 MG/DL (ref 30–149)
WBC # BLD AUTO: 8.1 X10(3) UL (ref 4–11)

## 2022-03-16 PROCEDURE — 85025 COMPLETE CBC W/AUTO DIFF WBC: CPT

## 2022-03-16 PROCEDURE — 36415 COLL VENOUS BLD VENIPUNCTURE: CPT

## 2022-03-16 PROCEDURE — 80061 LIPID PANEL: CPT

## 2022-03-16 PROCEDURE — 80053 COMPREHEN METABOLIC PANEL: CPT

## 2022-03-16 PROCEDURE — G0439 PPPS, SUBSEQ VISIT: HCPCS | Performed by: INTERNAL MEDICINE

## 2022-03-16 RX ORDER — FLUTICASONE PROPIONATE 50 MCG
2 SPRAY, SUSPENSION (ML) NASAL DAILY
Qty: 1 EACH | Refills: 1 | Status: SHIPPED | OUTPATIENT
Start: 2022-03-16

## 2022-04-05 ENCOUNTER — HOSPITAL ENCOUNTER (OUTPATIENT)
Dept: MAMMOGRAPHY | Age: 72
Discharge: HOME | End: 2022-04-05
Attending: INTERNAL MEDICINE

## 2022-04-05 DIAGNOSIS — Z12.31 ENCOUNTER FOR SCREENING MAMMOGRAM FOR MALIGNANT NEOPLASM OF BREAST: ICD-10-CM

## 2022-04-05 PROCEDURE — 77063 BREAST TOMOSYNTHESIS BI: CPT

## 2022-04-11 RX ORDER — FLUTICASONE PROPIONATE 50 MCG
2 SPRAY, SUSPENSION (ML) NASAL DAILY
Qty: 16 ML | Refills: 3 | Status: SHIPPED | OUTPATIENT
Start: 2022-04-11

## 2022-04-11 NOTE — TELEPHONE ENCOUNTER
Refill passed per [facility] protocol.   Requested Prescriptions   Pending Prescriptions Disp Refills    FLUTICASONE PROPIONATE 50 MCG/ACT Nasal Suspension [Pharmacy Med Name: FLUTICASONE PROP 50 MCG SPRAY] 16 mL 1     Sig: SPRAY 2 SPRAYS INTO EACH NOSTRIL EVERY DAY        Allergy Medication Protocol Passed - 4/10/2022  9:32 AM        Passed - Appoinment in past 12 or next 3 months            Recent Outpatient Visits              3 weeks ago Physical exam, annual    Trevon Araiza MD    Office Visit    1 year ago History of ovarian cancer    [address], [place], [place], [place]    Office Visit    1 year ago [address] [facility] JOEY Herrera    Office Visit    2 years ago Gastroesophageal reflux disease with esophagitis    [address], [place] Fleta Cromer, MD    Office Visit    2 years ago Alphonso Rivera MD    Office Visit

## 2022-04-12 ENCOUNTER — HOSPITAL ENCOUNTER (OUTPATIENT)
Dept: MAMMOGRAPHY | Age: 72
Discharge: HOME | End: 2022-04-12
Attending: INTERNAL MEDICINE

## 2022-04-12 ENCOUNTER — HOSPITAL ENCOUNTER (OUTPATIENT)
Dept: ULTRASOUND IMAGING | Age: 72
Discharge: HOME | End: 2022-04-12
Attending: INTERNAL MEDICINE

## 2022-04-12 DIAGNOSIS — N63.20 LEFT BREAST MASS: ICD-10-CM

## 2022-04-12 DIAGNOSIS — R92.8 ABNORMAL SCREENING MAMMOGRAM: ICD-10-CM

## 2022-04-12 PROCEDURE — 76642 ULTRASOUND BREAST LIMITED: CPT

## 2022-04-12 PROCEDURE — G0279 TOMOSYNTHESIS, MAMMO: HCPCS

## 2022-04-13 ENCOUNTER — MED REC SCAN ONLY (OUTPATIENT)
Dept: INTERNAL MEDICINE CLINIC | Facility: CLINIC | Age: 72
End: 2022-04-13

## 2022-04-19 RX ORDER — FAMOTIDINE 40 MG/1
TABLET, FILM COATED ORAL
Qty: 90 TABLET | Refills: 1 | Status: SHIPPED | OUTPATIENT
Start: 2022-04-19

## 2022-04-19 NOTE — TELEPHONE ENCOUNTER
Refill passed per [place] [employer] protocol    Requested Prescriptions   Pending Prescriptions Disp Refills    FAMOTIDINE 40 MG Oral Tab [Pharmacy Med Name: FAMOTIDINE 40 MG TABLET] 90 tablet 1     Sig: TAKE 1 TABLET BY MOUTH EVERY DAY        Gastrointestional Medication Protocol Passed - 4/19/2022 12:30 PM        Passed - Appointment in past 12 or next 3 months                  Recent Outpatient Visits              1 month ago Physical exam, annual    Trevon Araiza MD    Office Visit    1 year ago History of ovarian cancer    [address] [facility], Barry Dance, [place], [place]    Office Visit    1 year ago [address] [place], [place] [address]JOEY    Office Visit    2 years ago Gastroesophageal reflux disease with esophagitis    [address] [facility], [place] Fleta Cromer, MD    Office Visit    2 years ago [facility], [address] [place] [address], Alphonso Yang MD    Office Visit

## 2022-05-19 ENCOUNTER — HOSPITAL ENCOUNTER (OUTPATIENT)
Dept: ULTRASOUND IMAGING | Age: 72
Discharge: HOME | End: 2022-05-19
Attending: INTERNAL MEDICINE
Payer: MEDICARE

## 2022-05-19 DIAGNOSIS — N28.1 CYST OF RIGHT KIDNEY: ICD-10-CM

## 2022-05-19 PROCEDURE — 76775 US EXAM ABDO BACK WALL LIM: CPT | Performed by: INTERNAL MEDICINE

## 2022-10-17 ENCOUNTER — TELEPHONE (OUTPATIENT)
Dept: INTERNAL MEDICINE CLINIC | Facility: CLINIC | Age: 72
End: 2022-10-17

## 2022-10-17 DIAGNOSIS — R92.8 ABNORMAL MAMMOGRAM OF LEFT BREAST: Primary | ICD-10-CM

## 2022-10-17 NOTE — TELEPHONE ENCOUNTER
Per patient she needs an Order for her 6 months follow up Mammogram and need to fax to [facility] @ [phone]. Please call patient so that she'll know when to make an appointment.

## 2022-10-17 NOTE — TELEPHONE ENCOUNTER
Pt had abnormal left breast mammogram: new orders for left breast diagnostic mammogram and ultrasound placed. Please fax to [facility]. Thank you!

## 2022-10-18 DIAGNOSIS — R92.8 ABNORMAL MAMMOGRAM OF LEFT BREAST: Primary | ICD-10-CM

## 2022-10-27 ENCOUNTER — APPOINTMENT (OUTPATIENT)
Dept: ULTRASOUND IMAGING | Age: 72
End: 2022-10-27

## 2022-10-27 ENCOUNTER — HOSPITAL ENCOUNTER (OUTPATIENT)
Dept: MAMMOGRAPHY | Age: 72
Discharge: HOME | End: 2022-10-27

## 2022-10-27 DIAGNOSIS — R92.8 ABNORMAL MAMMOGRAM OF LEFT BREAST: ICD-10-CM

## 2022-10-27 PROCEDURE — G0279 TOMOSYNTHESIS, MAMMO: HCPCS

## 2022-11-18 ENCOUNTER — MED REC SCAN ONLY (OUTPATIENT)
Dept: INTERNAL MEDICINE CLINIC | Facility: CLINIC | Age: 72
End: 2022-11-18

## 2022-12-20 RX ORDER — FAMOTIDINE 40 MG/1
TABLET, FILM COATED ORAL
Qty: 90 TABLET | Refills: 1 | Status: SHIPPED | OUTPATIENT
Start: 2022-12-20

## 2022-12-20 NOTE — TELEPHONE ENCOUNTER
Refill passed per [address] [place] [address] protocol.   Requested Prescriptions   Pending Prescriptions Disp Refills    FAMOTIDINE 40 MG Oral Tab [Pharmacy Med Name: FAMOTIDINE 40 MG TABLET] 90 tablet 1     Sig: TAKE 1 TABLET BY MOUTH EVERY DAY       Gastrointestional Medication Protocol Passed - 12/20/2022 12:17 AM        Passed - In person appointment or virtual visit in the past 12 mos or appointment in next 3 mos     Recent Outpatient Visits              9 months ago Physical exam, annual    [address] [facility] [place], [place]    Office Visit    1 year ago History of ovarian cancer    [address] [place], [place]    Office Visit    1 year ago [address] [facility] Gerhard Cao PA-C    Office Visit    3 years ago Gastroesophageal reflux disease with esophagitis    [facility], [address] [place] [address], Mary Delgado MD    Office Visit    3 years ago Ade Turner MD    Office Visit                         Recent Outpatient Visits              9 months ago Physical exam, annual    [address] [facility] [place]    Office Visit    1 year ago [facility] of ovarian cancer    [address] [facility] [place] [place]    Office Visit    1 year ago [address] [facility] Gerhard Cao PA-C    Office Visit    3 years ago Gastroesophageal reflux disease with esophagitis    [address] [facility], [place] Star Norse, MD    Office Visit    3 years ago [facility], [address] [place] [address], Ade Orellana MD    Office Visit

## 2023-03-09 ENCOUNTER — TELEPHONE (OUTPATIENT)
Dept: INTERNAL MEDICINE CLINIC | Facility: CLINIC | Age: 73
End: 2023-03-09

## 2023-03-09 NOTE — TELEPHONE ENCOUNTER
Patient is due for Medicare Annual Wellness visit. Left message for patient to call back to schedule.

## 2023-04-26 ENCOUNTER — LAB ENCOUNTER (OUTPATIENT)
Dept: LAB | Age: 73
End: 2023-04-26
Attending: INTERNAL MEDICINE
Payer: MEDICARE

## 2023-04-26 ENCOUNTER — OFFICE VISIT (OUTPATIENT)
Dept: INTERNAL MEDICINE CLINIC | Facility: CLINIC | Age: 73
End: 2023-04-26

## 2023-04-26 VITALS
SYSTOLIC BLOOD PRESSURE: 168 MMHG | HEART RATE: 120 BPM | RESPIRATION RATE: 16 BRPM | HEIGHT: 63 IN | DIASTOLIC BLOOD PRESSURE: 91 MMHG | BODY MASS INDEX: 24.21 KG/M2 | WEIGHT: 136.63 LBS

## 2023-04-26 DIAGNOSIS — R09.89 BRUIT OF LEFT CAROTID ARTERY: ICD-10-CM

## 2023-04-26 DIAGNOSIS — K21.9 GASTROESOPHAGEAL REFLUX DISEASE WITHOUT ESOPHAGITIS: ICD-10-CM

## 2023-04-26 DIAGNOSIS — E78.49 OTHER HYPERLIPIDEMIA: ICD-10-CM

## 2023-04-26 DIAGNOSIS — R03.0 WHITE COAT SYNDROME WITHOUT DIAGNOSIS OF HYPERTENSION: ICD-10-CM

## 2023-04-26 DIAGNOSIS — J30.1 NON-SEASONAL ALLERGIC RHINITIS DUE TO POLLEN: ICD-10-CM

## 2023-04-26 DIAGNOSIS — Z12.31 BREAST CANCER SCREENING BY MAMMOGRAM: ICD-10-CM

## 2023-04-26 DIAGNOSIS — Z00.00 MEDICARE ANNUAL WELLNESS VISIT, SUBSEQUENT: Primary | ICD-10-CM

## 2023-04-26 LAB
ALBUMIN SERPL-MCNC: 3.9 G/DL (ref 3.4–5)
ALBUMIN/GLOB SERPL: 1.2 {RATIO} (ref 1–2)
ALP LIVER SERPL-CCNC: 94 U/L
ALT SERPL-CCNC: 23 U/L
ANION GAP SERPL CALC-SCNC: 5 MMOL/L (ref 0–18)
AST SERPL-CCNC: 21 U/L (ref 15–37)
BASOPHILS # BLD AUTO: 0.1 X10(3) UL (ref 0–0.2)
BASOPHILS NFR BLD AUTO: 1 %
BILIRUB SERPL-MCNC: 0.5 MG/DL (ref 0.1–2)
BUN BLD-MCNC: 19 MG/DL (ref 7–18)
BUN/CREAT SERPL: 21.8 (ref 10–20)
CALCIUM BLD-MCNC: 9.4 MG/DL (ref 8.5–10.1)
CHLORIDE SERPL-SCNC: 106 MMOL/L (ref 98–112)
CHOLEST SERPL-MCNC: 214 MG/DL (ref ?–200)
CO2 SERPL-SCNC: 30 MMOL/L (ref 21–32)
CREAT BLD-MCNC: 0.87 MG/DL
DEPRECATED RDW RBC AUTO: 41.5 FL (ref 35.1–46.3)
EOSINOPHIL # BLD AUTO: 0.13 X10(3) UL (ref 0–0.7)
EOSINOPHIL NFR BLD AUTO: 1.3 %
ERYTHROCYTE [DISTWIDTH] IN BLOOD BY AUTOMATED COUNT: 12.3 % (ref 11–15)
FASTING PATIENT LIPID ANSWER: YES
FASTING STATUS PATIENT QL REPORTED: YES
GFR SERPLBLD BASED ON 1.73 SQ M-ARVRAT: 70 ML/MIN/1.73M2 (ref 60–?)
GLOBULIN PLAS-MCNC: 3.3 G/DL (ref 2.8–4.4)
GLUCOSE BLD-MCNC: 98 MG/DL (ref 70–99)
HCT VFR BLD AUTO: 46.5 %
HDLC SERPL-MCNC: 61 MG/DL (ref 40–59)
HGB BLD-MCNC: 15.4 G/DL
IMM GRANULOCYTES # BLD AUTO: 0.02 X10(3) UL (ref 0–1)
IMM GRANULOCYTES NFR BLD: 0.2 %
LDLC SERPL CALC-MCNC: 123 MG/DL (ref ?–100)
LYMPHOCYTES # BLD AUTO: 4.38 X10(3) UL (ref 1–4)
LYMPHOCYTES NFR BLD AUTO: 44.2 %
MCH RBC QN AUTO: 30.4 PG (ref 26–34)
MCHC RBC AUTO-ENTMCNC: 33.1 G/DL (ref 31–37)
MCV RBC AUTO: 91.7 FL
MONOCYTES # BLD AUTO: 0.5 X10(3) UL (ref 0.1–1)
MONOCYTES NFR BLD AUTO: 5 %
NEUTROPHILS # BLD AUTO: 4.78 X10 (3) UL (ref 1.5–7.7)
NEUTROPHILS # BLD AUTO: 4.78 X10(3) UL (ref 1.5–7.7)
NEUTROPHILS NFR BLD AUTO: 48.3 %
NONHDLC SERPL-MCNC: 153 MG/DL (ref ?–130)
OSMOLALITY SERPL CALC.SUM OF ELEC: 294 MOSM/KG (ref 275–295)
PLATELET # BLD AUTO: 201 10(3)UL (ref 150–450)
POTASSIUM SERPL-SCNC: 4 MMOL/L (ref 3.5–5.1)
PROT SERPL-MCNC: 7.2 G/DL (ref 6.4–8.2)
RBC # BLD AUTO: 5.07 X10(6)UL
SODIUM SERPL-SCNC: 141 MMOL/L (ref 136–145)
TRIGL SERPL-MCNC: 169 MG/DL (ref 30–149)
VLDLC SERPL CALC-MCNC: 30 MG/DL (ref 0–30)
WBC # BLD AUTO: 9.9 X10(3) UL (ref 4–11)

## 2023-04-26 PROCEDURE — 1126F AMNT PAIN NOTED NONE PRSNT: CPT | Performed by: INTERNAL MEDICINE

## 2023-04-26 PROCEDURE — 85025 COMPLETE CBC W/AUTO DIFF WBC: CPT

## 2023-04-26 PROCEDURE — 80061 LIPID PANEL: CPT

## 2023-04-26 PROCEDURE — G0439 PPPS, SUBSEQ VISIT: HCPCS | Performed by: INTERNAL MEDICINE

## 2023-04-26 PROCEDURE — 80053 COMPREHEN METABOLIC PANEL: CPT

## 2023-04-26 PROCEDURE — 36415 COLL VENOUS BLD VENIPUNCTURE: CPT

## 2023-05-04 ENCOUNTER — ORDER TRANSCRIPTION (OUTPATIENT)
Dept: ADMINISTRATIVE | Facility: HOSPITAL | Age: 73
End: 2023-05-04

## 2023-05-04 DIAGNOSIS — Z13.6 SCREENING FOR CARDIOVASCULAR CONDITION: Primary | ICD-10-CM

## 2023-05-15 ENCOUNTER — OFFICE VISIT (OUTPATIENT)
Dept: DERMATOLOGY CLINIC | Facility: CLINIC | Age: 73
End: 2023-05-15

## 2023-05-15 DIAGNOSIS — L82.1 SEBORRHEIC KERATOSES: Primary | ICD-10-CM

## 2023-05-15 DIAGNOSIS — D22.9 MULTIPLE BENIGN NEVI: ICD-10-CM

## 2023-05-15 DIAGNOSIS — L82.0 SEBORRHEIC KERATOSIS, INFLAMED: ICD-10-CM

## 2023-05-15 DIAGNOSIS — L81.4 LENTIGINES: ICD-10-CM

## 2023-05-15 PROCEDURE — 99203 OFFICE O/P NEW LOW 30 MIN: CPT | Performed by: STUDENT IN AN ORGANIZED HEALTH CARE EDUCATION/TRAINING PROGRAM

## 2023-05-26 ENCOUNTER — HOSPITAL ENCOUNTER (OUTPATIENT)
Dept: ULTRASOUND IMAGING | Facility: HOSPITAL | Age: 73
Discharge: HOME | End: 2023-05-26
Attending: INTERNAL MEDICINE
Payer: MEDICARE

## 2023-05-26 DIAGNOSIS — R09.89 BRUIT OF LEFT CAROTID ARTERY: ICD-10-CM

## 2023-05-26 PROCEDURE — 93880 EXTRACRANIAL BILAT STUDY: CPT | Performed by: INTERNAL MEDICINE

## 2023-05-28 ENCOUNTER — HOSPITAL ENCOUNTER (OUTPATIENT)
Dept: CT IMAGING | Age: 73
Discharge: HOME | End: 2023-05-28
Attending: INTERNAL MEDICINE

## 2023-05-28 DIAGNOSIS — Z13.6 SCREENING FOR CARDIOVASCULAR CONDITION: ICD-10-CM

## 2023-06-02 ENCOUNTER — HOSPITAL ENCOUNTER (OUTPATIENT)
Dept: CT IMAGING | Age: 73
Discharge: HOME | End: 2023-06-02
Attending: INTERNAL MEDICINE
Payer: MEDICARE

## 2023-06-02 DIAGNOSIS — R91.1 PULMONARY NODULE: ICD-10-CM

## 2023-06-02 PROCEDURE — 71250 CT THORAX DX C-: CPT | Performed by: INTERNAL MEDICINE

## 2023-06-13 ENCOUNTER — OFFICE VISIT (OUTPATIENT)
Dept: SURGERY | Facility: CLINIC | Age: 73
End: 2023-06-13

## 2023-06-13 VITALS — HEIGHT: 63 IN | BODY MASS INDEX: 24.1 KG/M2 | WEIGHT: 136 LBS

## 2023-06-13 DIAGNOSIS — K44.9 PARAESOPHAGEAL HERNIA: Primary | ICD-10-CM

## 2023-06-13 PROCEDURE — 99204 OFFICE O/P NEW MOD 45 MIN: CPT | Performed by: SURGERY

## 2023-06-29 RX ORDER — FAMOTIDINE 40 MG/1
TABLET, FILM COATED ORAL
Qty: 90 TABLET | Refills: 3 | Status: SHIPPED | OUTPATIENT
Start: 2023-06-29

## 2023-09-08 ENCOUNTER — LAB ENCOUNTER (OUTPATIENT)
Dept: LAB | Age: 73
End: 2023-09-08
Attending: INTERNAL MEDICINE
Payer: MEDICARE

## 2023-09-08 DIAGNOSIS — R89.9 ABNORMAL LABORATORY TEST: ICD-10-CM

## 2023-09-08 LAB
BASOPHILS # BLD AUTO: 0.09 X10(3) UL (ref 0–0.2)
BASOPHILS NFR BLD AUTO: 0.9 %
DEPRECATED RDW RBC AUTO: 41.8 FL (ref 35.1–46.3)
EOSINOPHIL # BLD AUTO: 0.25 X10(3) UL (ref 0–0.7)
EOSINOPHIL NFR BLD AUTO: 2.5 %
ERYTHROCYTE [DISTWIDTH] IN BLOOD BY AUTOMATED COUNT: 12.6 % (ref 11–15)
HCT VFR BLD AUTO: 43.5 %
HGB BLD-MCNC: 14.7 G/DL
IMM GRANULOCYTES # BLD AUTO: 0.03 X10(3) UL (ref 0–1)
IMM GRANULOCYTES NFR BLD: 0.3 %
LYMPHOCYTES # BLD AUTO: 5.5 X10(3) UL (ref 1–4)
LYMPHOCYTES NFR BLD AUTO: 54.4 %
MCH RBC QN AUTO: 30.9 PG (ref 26–34)
MCHC RBC AUTO-ENTMCNC: 33.8 G/DL (ref 31–37)
MCV RBC AUTO: 91.6 FL
MONOCYTES # BLD AUTO: 0.55 X10(3) UL (ref 0.1–1)
MONOCYTES NFR BLD AUTO: 5.4 %
NEUTROPHILS # BLD AUTO: 3.69 X10 (3) UL (ref 1.5–7.7)
NEUTROPHILS # BLD AUTO: 3.69 X10(3) UL (ref 1.5–7.7)
NEUTROPHILS NFR BLD AUTO: 36.5 %
PLATELET # BLD AUTO: 192 10(3)UL (ref 150–450)
RBC # BLD AUTO: 4.75 X10(6)UL
WBC # BLD AUTO: 10.1 X10(3) UL (ref 4–11)

## 2023-09-08 PROCEDURE — 85060 BLOOD SMEAR INTERPRETATION: CPT

## 2023-09-08 PROCEDURE — 85025 COMPLETE CBC W/AUTO DIFF WBC: CPT

## 2023-09-08 PROCEDURE — 36415 COLL VENOUS BLD VENIPUNCTURE: CPT

## 2023-09-11 ENCOUNTER — TELEPHONE (OUTPATIENT)
Dept: INTERNAL MEDICINE CLINIC | Facility: CLINIC | Age: 73
End: 2023-09-11

## 2023-09-11 NOTE — TELEPHONE ENCOUNTER
Spoke with the patient,verified full name and     Informed her of message and advised her blood order has already been placed.     No further questions

## 2023-09-11 NOTE — TELEPHONE ENCOUNTER
Spoke to patient (name and  of patient verified). She is calling about test result. Unable to document in comment string under labs, telephone encounter created. Dr. Darlin Dan results and recommendations reviewed with patient. Patient is unsure when to repeat lab and would like clarification. Patient reports she has a large hiatal hernia and she researched online that it may cause elevated white blood cells. Patient also reports history of lump on arm from Covid booster 1 year ago and feels that may be causing elevated white blood cell count. Reinforced Dr. Darlin Dan recommendations and explained that the specialist would look more into the cause of this condition, verbalizes understanding. Result note from labs collected 23:   Elevated lymphocyte count, borderline condition not pointing to anything definitely at this point. We can do 1 more test and see if it persist and after that I would suggest to see hematologist   Written by Sheryl Burns MD on 9/10/2023 10:50 PM CDT    Dr. Monica Carias, please advise when patient should repeat CBC. Thank you.

## 2023-12-18 ENCOUNTER — LAB ENCOUNTER (OUTPATIENT)
Dept: LAB | Age: 73
End: 2023-12-18
Attending: INTERNAL MEDICINE
Payer: MEDICARE

## 2023-12-18 DIAGNOSIS — D72.820 LYMPHOCYTOSIS: ICD-10-CM

## 2023-12-18 LAB
BASOPHILS # BLD AUTO: 0.09 X10(3) UL (ref 0–0.2)
BASOPHILS NFR BLD AUTO: 0.8 %
DEPRECATED RDW RBC AUTO: 42.5 FL (ref 35.1–46.3)
EOSINOPHIL # BLD AUTO: 0.21 X10(3) UL (ref 0–0.7)
EOSINOPHIL NFR BLD AUTO: 2 %
ERYTHROCYTE [DISTWIDTH] IN BLOOD BY AUTOMATED COUNT: 12.5 % (ref 11–15)
HCT VFR BLD AUTO: 43.4 %
HGB BLD-MCNC: 14.6 G/DL
IMM GRANULOCYTES # BLD AUTO: 0.02 X10(3) UL (ref 0–1)
IMM GRANULOCYTES NFR BLD: 0.2 %
LYMPHOCYTES # BLD AUTO: 6.07 X10(3) UL (ref 1–4)
LYMPHOCYTES NFR BLD AUTO: 56.6 %
MCH RBC QN AUTO: 31 PG (ref 26–34)
MCHC RBC AUTO-ENTMCNC: 33.6 G/DL (ref 31–37)
MCV RBC AUTO: 92.1 FL
MONOCYTES # BLD AUTO: 0.55 X10(3) UL (ref 0.1–1)
MONOCYTES NFR BLD AUTO: 5.1 %
NEUTROPHILS # BLD AUTO: 3.78 X10 (3) UL (ref 1.5–7.7)
NEUTROPHILS # BLD AUTO: 3.78 X10(3) UL (ref 1.5–7.7)
NEUTROPHILS NFR BLD AUTO: 35.3 %
PLATELET # BLD AUTO: 200 10(3)UL (ref 150–450)
RBC # BLD AUTO: 4.71 X10(6)UL
WBC # BLD AUTO: 10.7 X10(3) UL (ref 4–11)

## 2023-12-18 PROCEDURE — 85025 COMPLETE CBC W/AUTO DIFF WBC: CPT

## 2023-12-18 PROCEDURE — 36415 COLL VENOUS BLD VENIPUNCTURE: CPT

## 2023-12-26 ENCOUNTER — TELEPHONE (OUTPATIENT)
Dept: INTERNAL MEDICINE CLINIC | Facility: CLINIC | Age: 73
End: 2023-12-26

## 2023-12-26 ENCOUNTER — TELEPHONE (OUTPATIENT)
Dept: HEMATOLOGY/ONCOLOGY | Facility: HOSPITAL | Age: 73
End: 2023-12-26

## 2023-12-26 ENCOUNTER — TELEPHONE (OUTPATIENT)
Dept: SURGERY | Facility: CLINIC | Age: 73
End: 2023-12-26

## 2023-12-26 NOTE — TELEPHONE ENCOUNTER
Patient calling ( identified name and  ) asking about referral  for Hematologist     Provided information for  Dr Kady Reddy  at [phone]    Patient verbalizes understanding and agrees with plan.

## 2023-12-26 NOTE — TELEPHONE ENCOUNTER
Please call patient to schedule a consult with Dr Schulz.  Referred by Dr Chao. Thank you.   HPI:  63y/o M on ASA/Plavix s/p TURBT on 21 presenting with hematuria and clots. Pt was seen with similar symptoms earlier this week and was sent home and asked to discontinue Plavix. Pt states he was in clot retention at home and was able to pass a large amount of clot while in the shower. Now voiding well on his own and no longer passing clots and urine clearing to an jose armando color. Denies fevers, nausea, vomiting, flank pain, frequency, dysuria, or urgency at this time.    PAST MEDICAL & SURGICAL HISTORY:  Coronary artery disease  s/p stent May 2020    Hypertension    Dyslipidemia    H/O aortic dissection  1.7CM    Heart attack  2020    Anxiety  denies suicide ideation    BPH with urinary obstruction    History of left inguinal hernia repair    S/P coronary angioplasty  stent x1 2020    History of appendectomy        REVIEW OF SYSTEMS   [x] A ten-point review of systems was otherwise negative except as noted.    MEDICATIONS  (STANDING):    MEDICATIONS  (PRN):      Allergies    No Known Allergies    Intolerances      SOCIAL HISTORY: No illicit drug use    FAMILY HISTORY:  FH: hypertension (Mother)  Mother    Family history of cerebrovascular accident (CVA) in mother (Mother)    Vital Signs Last 24 Hrs  T(C): 37.2 (2021 13:38), Max: 37.2 (2021 13:38)  T(F): 98.9 (2021 13:38), Max: 98.9 (2021 13:38)  HR: 61 (2021 13:38) (61 - 61)  BP: 109/65 (2021 13:38) (109/65 - 109/65)  BP(mean): --  RR: 20 (2021 13:38) (20 - 20)  SpO2: 99% (2021 13:38) (99% - 99%)    PHYSICAL EXAM:  Constitutional: NAD, well-developed  HEENT: EOMI  Neck: no pain  Back: No CVA tenderness  Respiratory: No accessory respiratory muscle use  Abd: Soft, NT/ND  no organomegally  no hernia  : bladder nonpalpable on palpation, very mild suprapubic tenderness  Extremities: no edema  Neurological: A/O x 3  Psychiatric: Normal mood, normal affect  Skin: No rashes    I&O's Summary      LABS:                        12.0   8.37  )-----------( 174      ( 2021 14:42 )             35.3     06-    136  |  105  |  20  ----------------------------<  120<H>  3.9   |  19  |  0.8    Ca    9.1      2021 20:55    TPro  6.5  /  Alb  4.2  /  TBili  0.6  /  DBili  x   /  AST  21  /  ALT  20  /  AlkPhos  50        Urinalysis Basic - ( 2021 00:00 )    Color: Light Yellow / Appearance: Clear / S.009 / pH: x  Gluc: x / Ketone: Small  / Bili: Negative / Urobili: <2 mg/dL   Blood: x / Protein: Trace / Nitrite: Negative   Leuk Esterase: Negative / RBC: 9 /HPF / WBC 3 /HPF   Sq Epi: x / Non Sq Epi: 1 /HPF / Bacteria: Negative

## 2023-12-28 ENCOUNTER — OFFICE VISIT (OUTPATIENT)
Dept: HEMATOLOGY/ONCOLOGY | Facility: HOSPITAL | Age: 73
End: 2023-12-28
Attending: INTERNAL MEDICINE
Payer: MEDICARE

## 2023-12-28 ENCOUNTER — LAB ENCOUNTER (OUTPATIENT)
Dept: LAB | Facility: HOSPITAL | Age: 73
End: 2023-12-28
Attending: INTERNAL MEDICINE
Payer: MEDICARE

## 2023-12-28 VITALS
BODY MASS INDEX: 24.88 KG/M2 | RESPIRATION RATE: 18 BRPM | WEIGHT: 135.19 LBS | TEMPERATURE: 98 F | DIASTOLIC BLOOD PRESSURE: 70 MMHG | HEART RATE: 100 BPM | OXYGEN SATURATION: 99 % | SYSTOLIC BLOOD PRESSURE: 178 MMHG | HEIGHT: 62 IN

## 2023-12-28 DIAGNOSIS — D72.820 LYMPHOCYTOSIS: ICD-10-CM

## 2023-12-28 DIAGNOSIS — D72.820 LYMPHOCYTOSIS: Primary | ICD-10-CM

## 2023-12-28 LAB
ALBUMIN SERPL-MCNC: 4.6 G/DL (ref 3.2–4.8)
ALBUMIN/GLOB SERPL: 1.7 {RATIO} (ref 1–2)
ALP LIVER SERPL-CCNC: 90 U/L
ALT SERPL-CCNC: 13 U/L
ANION GAP SERPL CALC-SCNC: 6 MMOL/L (ref 0–18)
AST SERPL-CCNC: 21 U/L (ref ?–34)
BILIRUB SERPL-MCNC: 0.6 MG/DL (ref 0.2–1.1)
BUN BLD-MCNC: 16 MG/DL (ref 9–23)
BUN/CREAT SERPL: 17.4 (ref 10–20)
CALCIUM BLD-MCNC: 9.8 MG/DL (ref 8.7–10.4)
CHLORIDE SERPL-SCNC: 103 MMOL/L (ref 98–112)
CO2 SERPL-SCNC: 30 MMOL/L (ref 21–32)
CREAT BLD-MCNC: 0.92 MG/DL
EGFRCR SERPLBLD CKD-EPI 2021: 65 ML/MIN/1.73M2 (ref 60–?)
FASTING STATUS PATIENT QL REPORTED: YES
GLOBULIN PLAS-MCNC: 2.7 G/DL (ref 2.8–4.4)
GLUCOSE BLD-MCNC: 91 MG/DL (ref 70–99)
HAV AB SER QL IA: REACTIVE
HAV IGM SER QL: NONREACTIVE
HBV CORE AB SERPL QL IA: NONREACTIVE
HBV SURFACE AB SER QL: NONREACTIVE
HBV SURFACE AB SERPL IA-ACNC: <3.1 MIU/ML
HBV SURFACE AG SERPL QL IA: NONREACTIVE
HCV AB SERPL QL IA: NONREACTIVE
LDH SERPL L TO P-CCNC: 172 U/L
OSMOLALITY SERPL CALC.SUM OF ELEC: 289 MOSM/KG (ref 275–295)
POTASSIUM SERPL-SCNC: 3.9 MMOL/L (ref 3.5–5.1)
PROT SERPL-MCNC: 7.3 G/DL (ref 5.7–8.2)
SODIUM SERPL-SCNC: 139 MMOL/L (ref 136–145)

## 2023-12-28 PROCEDURE — 86708 HEPATITIS A ANTIBODY: CPT

## 2023-12-28 PROCEDURE — 83615 LACTATE (LD) (LDH) ENZYME: CPT

## 2023-12-28 PROCEDURE — 88189 FLOWCYTOMETRY/READ 16 & >: CPT

## 2023-12-28 PROCEDURE — 86709 HEPATITIS A IGM ANTIBODY: CPT

## 2023-12-28 PROCEDURE — 36415 COLL VENOUS BLD VENIPUNCTURE: CPT

## 2023-12-28 PROCEDURE — 80053 COMPREHEN METABOLIC PANEL: CPT

## 2023-12-28 PROCEDURE — 87340 HEPATITIS B SURFACE AG IA: CPT

## 2023-12-28 PROCEDURE — 88185 FLOWCYTOMETRY/TC ADD-ON: CPT

## 2023-12-28 PROCEDURE — 80503 PATH CLIN CONSLTJ SF 5-20: CPT

## 2023-12-28 PROCEDURE — 86704 HEP B CORE ANTIBODY TOTAL: CPT

## 2023-12-28 PROCEDURE — 86803 HEPATITIS C AB TEST: CPT

## 2023-12-28 PROCEDURE — 88184 FLOWCYTOMETRY/ TC 1 MARKER: CPT

## 2023-12-28 PROCEDURE — 87389 HIV-1 AG W/HIV-1&-2 AB AG IA: CPT

## 2023-12-28 PROCEDURE — 99203 OFFICE O/P NEW LOW 30 MIN: CPT | Performed by: INTERNAL MEDICINE

## 2023-12-28 PROCEDURE — 86706 HEP B SURFACE ANTIBODY: CPT

## 2023-12-28 RX ORDER — CALCIUM CARBONATE 500 MG/1
1 TABLET, CHEWABLE ORAL DAILY
COMMUNITY

## 2023-12-29 LAB
CD10 CELLS NFR SPEC: <1 %
CD10/CD19: <1 %
CD19 CELLS NFR SPEC: 68 %
CD19+/CD200+: 66 %
CD2 CELLS NFR SPEC: 30 %
CD20 CELLS NFR SPEC: 68 %
CD200 CELLS: 68 %
CD3 CELLS NFR SPEC: 24 %
CD3+/TCRGD+: 1 %
CD3+CD4+ CELLS NFR SPEC: 12 %
CD3+CD4+ CELLS/CD3+CD8+ CLL SPEC: 1.2
CD3+CD8+ CELLS NFR SPEC: 10 %
CD3-/CD56+: 8 %
CD34 CELLS NFR SPEC: <1 %
CD38 CELLS NFR SPEC: 3 %
CD38+/CD19+: <1 %
CD45 CELLS NFR SPEC: 100 %
CD5 CELLS NFR SPEC: 87 %
CD5/CD19 CELLS: 65 %
CD7 CELLS NFR SPEC: 34 %
CELL SURF KAPPA/LAMBDA RATIO: 34
CELL SURF LAMBDA LIGHT CHAIN: 2 %
CELL SURFACE KAPPA LIGHT CHAIN: 68 %
TCR G-D CELLS NFR SPEC: 1 %

## 2024-01-30 ENCOUNTER — OFFICE VISIT (OUTPATIENT)
Dept: SURGERY | Facility: CLINIC | Age: 74
End: 2024-01-30

## 2024-01-30 ENCOUNTER — TELEPHONE (OUTPATIENT)
Dept: SURGERY | Facility: CLINIC | Age: 74
End: 2024-01-30

## 2024-01-30 DIAGNOSIS — K44.9 PARAESOPHAGEAL HERNIA: Primary | ICD-10-CM

## 2024-01-30 PROCEDURE — 99214 OFFICE O/P EST MOD 30 MIN: CPT | Performed by: SURGERY

## 2024-01-30 NOTE — H&P
HPI:    Patient ID: Vijaya Shipley is a 73 year old female presenting with   Chief Complaint   Patient presents with    Hernia     Pt here to discuss surgery for paraesophageal hernia repair.     .    Hernia  Associated symptoms include nausea and vomiting.       Past Medical History:   Diagnosis Date    Allergic rhinitis     Esophageal reflux 2017    Hiatal hernia     Ovarian cancer (HCC) 2003    s/p surgery alone (total abdominal hysterectomy with bilateral salpingo oopherectomy)     Past Surgical History:   Procedure Laterality Date    COLONOSCOPY  2017    EGD  2017    HERNIA SURGERY      HYSTERECTOMY      OTHER      removal of the ovaries    OTHER SURGICAL HISTORY  2014    septoplasty , dr contreras     TOTAL ABDOM HYSTERECTOMY       Family History   Problem Relation Age of Onset    Cancer Mother 57        cervical    Cancer Father 72        lung cancer;tobacco use    Cancer Sister 79        CLL    Crohn's Disease Daughter     Bleeding Disorders Neg     DVT/VTE Neg      Social History     Socioeconomic History    Marital status:      Spouse name: Not on file    Number of children: Not on file    Years of education: Not on file    Highest education level: Not on file   Occupational History    Not on file   Tobacco Use    Smoking status: Never     Passive exposure: Never    Smokeless tobacco: Never   Vaping Use    Vaping Use: Never used   Substance and Sexual Activity    Alcohol use: No    Drug use: No    Sexual activity: Not on file   Other Topics Concern    Grew up on a farm Not Asked    History of tanning Not Asked    Outdoor occupation Not Asked    Breast feeding No    Reaction to local anesthetic No    Pt has a pacemaker No    Pt has a defibrillator No   Social History Narrative    Vijaya is  to Jamey. She has 3 adult children. Patient is retired from being a . She lives with her  in [place], [place].     Social Determinants of Health     Financial Resource Strain: Not on file    Food Insecurity: Not on file   Transportation Needs: Not on file   Physical Activity: Not on file   Stress: Not on file   Social Connections: Not on file   Housing Stability: Not on file       Review of Systems   Constitutional: Negative.    HENT: Negative.     Eyes: Negative.    Respiratory: Negative.     Cardiovascular: Negative.    Gastrointestinal:  Positive for nausea and vomiting.   Endocrine: Negative.    Genitourinary: Negative.    Musculoskeletal: Negative.    Skin: Negative.    Allergic/Immunologic: Negative.    Neurological: Negative.    Hematological:  Does not bruise/bleed easily.   Psychiatric/Behavioral: Negative.             Current Outpatient Medications   Medication Sig Dispense Refill    calcium carbonate 500 MG Oral Chew Tab Chew 1 tablet (500 mg total) by mouth daily.      famotidine 40 MG Oral Tab TAKE 1 TABLET BY MOUTH EVERY DAY 90 tablet 3    LEVOCETIRIZINE 5 MG Oral Tab TAKE 1 TABLET BY MOUTH EVERY DAY IN THE EVENING 90 tablet 1       Allergies:  Allergies   Allergen Reactions    Seasonal       PHYSICAL EXAM:   There were no vitals taken for this visit.  Physical Exam  Vitals reviewed.   Constitutional:       Appearance: Normal appearance. She is well-developed.   HENT:      Head: Normocephalic and atraumatic.   Cardiovascular:      Rate and Rhythm: Normal rate and regular rhythm.   Pulmonary:      Effort: Pulmonary effort is normal.      Breath sounds: Normal breath sounds.   Abdominal:      General: There is no distension.      Palpations: Abdomen is soft. There is no mass.      Tenderness: There is no abdominal tenderness. There is no guarding or rebound.          Comments: Healed lower midline incision   Musculoskeletal:         General: Normal range of motion.      Cervical back: Normal range of motion and neck supple.   Skin:     General: Skin is warm and dry.   Neurological:      Mental Status: She is alert and oriented to person, place, and time.   Psychiatric:         Speech:  Speech normal.         Behavior: Behavior normal.                 ASSESSMENT/PLAN:   Diagnoses and all orders for this visit:    Paraesophageal hernia  -     XR ESOPHAGUS SINGLE CONTRAST (CPT=74220); Future    Plan for a laparoscopic paraesophageal hernia repair with bioA mesh, gastropexy.           Sukhjinder Palacios MD  1/30/2024

## 2024-02-01 ENCOUNTER — HOSPITAL ENCOUNTER (OUTPATIENT)
Dept: GENERAL RADIOLOGY | Facility: HOSPITAL | Age: 74
Discharge: HOME | End: 2024-02-01
Attending: SURGERY
Payer: MEDICARE

## 2024-02-01 DIAGNOSIS — K44.9 PARAESOPHAGEAL HERNIA: ICD-10-CM

## 2024-02-01 PROCEDURE — 74246 X-RAY XM UPR GI TRC 2CNTRST: CPT | Performed by: SURGERY

## 2024-02-07 ENCOUNTER — HOSPITAL ENCOUNTER (INPATIENT)
Facility: HOSPITAL | Age: 74
LOS: 1 days | Discharge: HOME | End: 2024-02-08
Attending: SURGERY | Admitting: HOSPITALIST
Payer: MEDICARE

## 2024-02-07 ENCOUNTER — ANESTHESIA (OUTPATIENT)
Dept: SURGERY | Facility: HOSPITAL | Age: 74
End: 2024-02-07
Payer: MEDICARE

## 2024-02-07 ENCOUNTER — ANESTHESIA EVENT (OUTPATIENT)
Dept: SURGERY | Facility: HOSPITAL | Age: 74
End: 2024-02-07
Payer: MEDICARE

## 2024-02-07 ENCOUNTER — HOSPITAL ENCOUNTER (INPATIENT)
Facility: HOSPITAL | Age: 74
LOS: 1 days | Discharge: HOME | DRG: 328 | End: 2024-02-08
Attending: SURGERY | Admitting: HOSPITALIST
Payer: MEDICARE

## 2024-02-07 DIAGNOSIS — K44.9 PARAESOPHAGEAL HERNIA: ICD-10-CM

## 2024-02-07 PROCEDURE — 0BUT4JZ SUPPLEMENT DIAPHRAGM WITH SYNTHETIC SUBSTITUTE, PERCUTANEOUS ENDOSCOPIC APPROACH: ICD-10-PCS | Performed by: SURGERY

## 2024-02-07 PROCEDURE — 43283 LAP ESOPH LENGTHENING: CPT | Performed by: SURGERY

## 2024-02-07 PROCEDURE — 0DNW4ZZ RELEASE PERITONEUM, PERCUTANEOUS ENDOSCOPIC APPROACH: ICD-10-PCS | Performed by: SURGERY

## 2024-02-07 PROCEDURE — 43282 LAP PARAESOPH HER RPR W/MESH: CPT | Performed by: SURGERY

## 2024-02-07 PROCEDURE — 0DS64ZZ REPOSITION STOMACH, PERCUTANEOUS ENDOSCOPIC APPROACH: ICD-10-PCS | Performed by: SURGERY

## 2024-02-07 PROCEDURE — 99222 1ST HOSP IP/OBS MODERATE 55: CPT | Performed by: HOSPITALIST

## 2024-02-07 DEVICE — BIO-A TISSUE REINFORCEMENT 7CMX10CM
Type: IMPLANTABLE DEVICE | Site: ESOPHAGUS | Status: FUNCTIONAL
Brand: GORE BIO-A TISSUE REINFORCEMENT

## 2024-02-07 RX ORDER — ONDANSETRON 2 MG/ML
4 INJECTION INTRAMUSCULAR; INTRAVENOUS EVERY 6 HOURS PRN
Status: DISCONTINUED | OUTPATIENT
Start: 2024-02-07 | End: 2024-02-07 | Stop reason: HOSPADM

## 2024-02-07 RX ORDER — PHENYLEPHRINE HCL 10 MG/ML
VIAL (ML) INJECTION AS NEEDED
Status: DISCONTINUED | OUTPATIENT
Start: 2024-02-07 | End: 2024-02-07 | Stop reason: SURG

## 2024-02-07 RX ORDER — MORPHINE SULFATE 10 MG/ML
6 INJECTION, SOLUTION INTRAMUSCULAR; INTRAVENOUS EVERY 10 MIN PRN
Status: DISCONTINUED | OUTPATIENT
Start: 2024-02-07 | End: 2024-02-07 | Stop reason: HOSPADM

## 2024-02-07 RX ORDER — SODIUM CHLORIDE 9 MG/ML
INJECTION, SOLUTION INTRAVENOUS CONTINUOUS PRN
Status: DISCONTINUED | OUTPATIENT
Start: 2024-02-07 | End: 2024-02-07 | Stop reason: SURG

## 2024-02-07 RX ORDER — CEFAZOLIN SODIUM/WATER 2 G/20 ML
2 SYRINGE (ML) INTRAVENOUS ONCE
Status: COMPLETED | OUTPATIENT
Start: 2024-02-07 | End: 2024-02-07

## 2024-02-07 RX ORDER — DEXAMETHASONE SODIUM PHOSPHATE 4 MG/ML
VIAL (ML) INJECTION AS NEEDED
Status: DISCONTINUED | OUTPATIENT
Start: 2024-02-07 | End: 2024-02-07 | Stop reason: SURG

## 2024-02-07 RX ORDER — BUPIVACAINE HYDROCHLORIDE 5 MG/ML
INJECTION, SOLUTION EPIDURAL; INTRACAUDAL AS NEEDED
Status: DISCONTINUED | OUTPATIENT
Start: 2024-02-07 | End: 2024-02-07 | Stop reason: HOSPADM

## 2024-02-07 RX ORDER — ACETAMINOPHEN 500 MG
1000 TABLET ORAL ONCE
Status: COMPLETED | OUTPATIENT
Start: 2024-02-07 | End: 2024-02-07

## 2024-02-07 RX ORDER — ONDANSETRON 2 MG/ML
4 INJECTION INTRAMUSCULAR; INTRAVENOUS EVERY 6 HOURS PRN
Status: DISCONTINUED | OUTPATIENT
Start: 2024-02-07 | End: 2024-02-08

## 2024-02-07 RX ORDER — SODIUM CHLORIDE, SODIUM LACTATE, POTASSIUM CHLORIDE, CALCIUM CHLORIDE 600; 310; 30; 20 MG/100ML; MG/100ML; MG/100ML; MG/100ML
INJECTION, SOLUTION INTRAVENOUS CONTINUOUS
Status: DISCONTINUED | OUTPATIENT
Start: 2024-02-07 | End: 2024-02-07 | Stop reason: HOSPADM

## 2024-02-07 RX ORDER — HYDROMORPHONE HYDROCHLORIDE 1 MG/ML
0.6 INJECTION, SOLUTION INTRAMUSCULAR; INTRAVENOUS; SUBCUTANEOUS EVERY 5 MIN PRN
Status: DISCONTINUED | OUTPATIENT
Start: 2024-02-07 | End: 2024-02-07 | Stop reason: HOSPADM

## 2024-02-07 RX ORDER — GLYCOPYRROLATE 0.2 MG/ML
INJECTION, SOLUTION INTRAMUSCULAR; INTRAVENOUS AS NEEDED
Status: DISCONTINUED | OUTPATIENT
Start: 2024-02-07 | End: 2024-02-07 | Stop reason: SURG

## 2024-02-07 RX ORDER — MORPHINE SULFATE 4 MG/ML
4 INJECTION, SOLUTION INTRAMUSCULAR; INTRAVENOUS EVERY 10 MIN PRN
Status: DISCONTINUED | OUTPATIENT
Start: 2024-02-07 | End: 2024-02-07 | Stop reason: HOSPADM

## 2024-02-07 RX ORDER — ROCURONIUM BROMIDE 10 MG/ML
INJECTION, SOLUTION INTRAVENOUS AS NEEDED
Status: DISCONTINUED | OUTPATIENT
Start: 2024-02-07 | End: 2024-02-07 | Stop reason: SURG

## 2024-02-07 RX ORDER — BISACODYL 10 MG
10 SUPPOSITORY, RECTAL RECTAL
Status: DISCONTINUED | OUTPATIENT
Start: 2024-02-07 | End: 2024-02-08

## 2024-02-07 RX ORDER — MIDAZOLAM HYDROCHLORIDE 1 MG/ML
INJECTION INTRAMUSCULAR; INTRAVENOUS AS NEEDED
Status: DISCONTINUED | OUTPATIENT
Start: 2024-02-07 | End: 2024-02-07 | Stop reason: SURG

## 2024-02-07 RX ORDER — METOCLOPRAMIDE HYDROCHLORIDE 5 MG/ML
10 INJECTION INTRAMUSCULAR; INTRAVENOUS EVERY 8 HOURS PRN
Status: DISCONTINUED | OUTPATIENT
Start: 2024-02-07 | End: 2024-02-07 | Stop reason: HOSPADM

## 2024-02-07 RX ORDER — SODIUM CHLORIDE, SODIUM LACTATE, POTASSIUM CHLORIDE, CALCIUM CHLORIDE 600; 310; 30; 20 MG/100ML; MG/100ML; MG/100ML; MG/100ML
INJECTION, SOLUTION INTRAVENOUS CONTINUOUS
Status: DISCONTINUED | OUTPATIENT
Start: 2024-02-07 | End: 2024-02-08

## 2024-02-07 RX ORDER — HYDROMORPHONE HYDROCHLORIDE 1 MG/ML
0.2 INJECTION, SOLUTION INTRAMUSCULAR; INTRAVENOUS; SUBCUTANEOUS EVERY 5 MIN PRN
Status: DISCONTINUED | OUTPATIENT
Start: 2024-02-07 | End: 2024-02-07 | Stop reason: HOSPADM

## 2024-02-07 RX ORDER — ENEMA 19; 7 G/133ML; G/133ML
1 ENEMA RECTAL ONCE AS NEEDED
Status: DISCONTINUED | OUTPATIENT
Start: 2024-02-07 | End: 2024-02-08

## 2024-02-07 RX ORDER — MORPHINE SULFATE 4 MG/ML
2 INJECTION, SOLUTION INTRAMUSCULAR; INTRAVENOUS EVERY 10 MIN PRN
Status: DISCONTINUED | OUTPATIENT
Start: 2024-02-07 | End: 2024-02-07 | Stop reason: HOSPADM

## 2024-02-07 RX ORDER — NALOXONE HYDROCHLORIDE 0.4 MG/ML
80 INJECTION, SOLUTION INTRAMUSCULAR; INTRAVENOUS; SUBCUTANEOUS AS NEEDED
Status: DISCONTINUED | OUTPATIENT
Start: 2024-02-07 | End: 2024-02-07 | Stop reason: HOSPADM

## 2024-02-07 RX ORDER — OXYCODONE HYDROCHLORIDE 5 MG/1
5 TABLET ORAL EVERY 4 HOURS PRN
Status: DISCONTINUED | OUTPATIENT
Start: 2024-02-07 | End: 2024-02-08

## 2024-02-07 RX ORDER — LIDOCAINE HYDROCHLORIDE 20 MG/ML
INJECTION, SOLUTION EPIDURAL; INFILTRATION; INTRACAUDAL; PERINEURAL AS NEEDED
Status: DISCONTINUED | OUTPATIENT
Start: 2024-02-07 | End: 2024-02-07 | Stop reason: SURG

## 2024-02-07 RX ORDER — HYDROMORPHONE HYDROCHLORIDE 1 MG/ML
0.4 INJECTION, SOLUTION INTRAMUSCULAR; INTRAVENOUS; SUBCUTANEOUS EVERY 2 HOUR PRN
Status: DISCONTINUED | OUTPATIENT
Start: 2024-02-07 | End: 2024-02-08

## 2024-02-07 RX ORDER — METOCLOPRAMIDE HYDROCHLORIDE 5 MG/ML
10 INJECTION INTRAMUSCULAR; INTRAVENOUS EVERY 8 HOURS PRN
Status: DISCONTINUED | OUTPATIENT
Start: 2024-02-07 | End: 2024-02-08

## 2024-02-07 RX ORDER — HYDROMORPHONE HYDROCHLORIDE 1 MG/ML
0.4 INJECTION, SOLUTION INTRAMUSCULAR; INTRAVENOUS; SUBCUTANEOUS EVERY 5 MIN PRN
Status: DISCONTINUED | OUTPATIENT
Start: 2024-02-07 | End: 2024-02-07 | Stop reason: HOSPADM

## 2024-02-07 RX ORDER — HYDROMORPHONE HYDROCHLORIDE 1 MG/ML
0.2 INJECTION, SOLUTION INTRAMUSCULAR; INTRAVENOUS; SUBCUTANEOUS EVERY 2 HOUR PRN
Status: DISCONTINUED | OUTPATIENT
Start: 2024-02-07 | End: 2024-02-08

## 2024-02-07 RX ORDER — OXYCODONE HYDROCHLORIDE 5 MG/1
2.5 TABLET ORAL EVERY 4 HOURS PRN
Status: DISCONTINUED | OUTPATIENT
Start: 2024-02-07 | End: 2024-02-08

## 2024-02-07 RX ORDER — POLYETHYLENE GLYCOL 3350 17 G/17G
17 POWDER, FOR SOLUTION ORAL DAILY PRN
Status: DISCONTINUED | OUTPATIENT
Start: 2024-02-07 | End: 2024-02-08

## 2024-02-07 RX ORDER — ACETAMINOPHEN 325 MG/1
650 TABLET ORAL EVERY 6 HOURS PRN
Status: DISCONTINUED | OUTPATIENT
Start: 2024-02-07 | End: 2024-02-08

## 2024-02-07 RX ORDER — SENNOSIDES 8.6 MG
17.2 TABLET ORAL NIGHTLY PRN
Status: DISCONTINUED | OUTPATIENT
Start: 2024-02-07 | End: 2024-02-08

## 2024-02-07 RX ADMIN — SODIUM CHLORIDE, SODIUM LACTATE, POTASSIUM CHLORIDE, CALCIUM CHLORIDE: 600; 310; 30; 20 INJECTION, SOLUTION INTRAVENOUS at 10:08:00

## 2024-02-07 RX ADMIN — PHENYLEPHRINE HCL 100 MCG: 10 MG/ML VIAL (ML) INJECTION at 09:11:00

## 2024-02-07 RX ADMIN — GLYCOPYRROLATE 0.2 MG: 0.2 INJECTION, SOLUTION INTRAMUSCULAR; INTRAVENOUS at 07:44:00

## 2024-02-07 RX ADMIN — PHENYLEPHRINE HCL 100 MCG: 10 MG/ML VIAL (ML) INJECTION at 08:38:00

## 2024-02-07 RX ADMIN — ROCURONIUM BROMIDE 20 MG: 10 INJECTION, SOLUTION INTRAVENOUS at 08:51:00

## 2024-02-07 RX ADMIN — ROCURONIUM BROMIDE 40 MG: 10 INJECTION, SOLUTION INTRAVENOUS at 07:53:00

## 2024-02-07 RX ADMIN — SODIUM CHLORIDE, SODIUM LACTATE, POTASSIUM CHLORIDE, CALCIUM CHLORIDE: 600; 310; 30; 20 INJECTION, SOLUTION INTRAVENOUS at 07:35:00

## 2024-02-07 RX ADMIN — PHENYLEPHRINE HCL 100 MCG: 10 MG/ML VIAL (ML) INJECTION at 09:25:00

## 2024-02-07 RX ADMIN — ROCURONIUM BROMIDE 5 MG: 10 INJECTION, SOLUTION INTRAVENOUS at 07:45:00

## 2024-02-07 RX ADMIN — SODIUM CHLORIDE: 9 INJECTION, SOLUTION INTRAVENOUS at 10:08:00

## 2024-02-07 RX ADMIN — CEFAZOLIN SODIUM/WATER 2 G: 2 G/20 ML SYRINGE (ML) INTRAVENOUS at 07:48:00

## 2024-02-07 RX ADMIN — PHENYLEPHRINE HCL 100 MCG: 10 MG/ML VIAL (ML) INJECTION at 08:43:00

## 2024-02-07 RX ADMIN — PHENYLEPHRINE HCL 200 MCG: 10 MG/ML VIAL (ML) INJECTION at 08:58:00

## 2024-02-07 RX ADMIN — LIDOCAINE HYDROCHLORIDE 60 MG: 20 INJECTION, SOLUTION EPIDURAL; INFILTRATION; INTRACAUDAL; PERINEURAL at 07:45:00

## 2024-02-07 RX ADMIN — ROCURONIUM BROMIDE 10 MG: 10 INJECTION, SOLUTION INTRAVENOUS at 09:35:00

## 2024-02-07 RX ADMIN — MIDAZOLAM HYDROCHLORIDE 2 MG: 1 INJECTION INTRAMUSCULAR; INTRAVENOUS at 07:37:00

## 2024-02-07 RX ADMIN — SODIUM CHLORIDE: 9 INJECTION, SOLUTION INTRAVENOUS at 07:50:00

## 2024-02-07 RX ADMIN — DEXAMETHASONE SODIUM PHOSPHATE 4 MG: 4 MG/ML VIAL (ML) INJECTION at 07:44:00

## 2024-02-07 RX ADMIN — PHENYLEPHRINE HCL 100 MCG: 10 MG/ML VIAL (ML) INJECTION at 09:23:00

## 2024-02-07 RX ADMIN — PHENYLEPHRINE HCL 100 MCG: 10 MG/ML VIAL (ML) INJECTION at 08:46:00

## 2024-02-07 RX ADMIN — PHENYLEPHRINE HCL 100 MCG: 10 MG/ML VIAL (ML) INJECTION at 08:35:00

## 2024-02-07 NOTE — ANESTHESIA PROCEDURE NOTES
Airway  Date/Time: 2/7/2024 7:48 AM  Urgency: Elective    Airway not difficult    General Information and Staff    Patient location during procedure: OR  Anesthesiologist: Daniela Crawley MD  Resident/CRNA: Anastacia Ram CRNA  Performed: CRNA   Performed by: Anastacia Ram CRNA  Authorized by: Daniela Crawley MD      Indications and Patient Condition  Indications for airway management: anesthesia  Sedation level: deep  Preoxygenated: yes  Patient position: sniffing  Mask difficulty assessment: 0 - not attempted    Final Airway Details  Final airway type: endotracheal airway      Successful airway: ETT  Cuffed: yes   Successful intubation technique: direct laryngoscopy  Facilitating devices/methods: cricoid pressure and rapid sequence intubation  Endotracheal tube insertion site: oral  Blade: Yamileth  Blade size: #3  ETT size (mm): 7.0    Cormack-Lehane Classification: grade I - full view of glottis  Placement verified by: capnometry   Measured from: lips  ETT to lips (cm): 21  Number of attempts at approach: 1    Additional Comments  Easy, atraumatic RSI/intubation. Teeth, lips and mucosa unchanged.

## 2024-02-07 NOTE — INTERVAL H&P NOTE
Pre-op Diagnosis: Paraesophageal hernia [K44.9]    The above referenced H&P was reviewed by Sukhjinder Palacios MD on 2/7/2024, the patient was examined and no significant changes have occurred in the patient's condition since the H&P was performed.  I discussed with the patient and/or legal representative the potential benefits, risks and side effects of this procedure; the likelihood of the patient achieving goals; and potential problems that might occur during recuperation.  I discussed reasonable alternatives to the procedure, including risks, benefits and side effects related to the alternatives and risks related to not receiving this procedure.  We will proceed with procedure as planned.

## 2024-02-07 NOTE — ANESTHESIA POSTPROCEDURE EVALUATION
Patient: Vijaya Shipley    Procedure Summary       Date: 02/07/24 Room / Location: [facility] MAIN OR 07 / [facility] MAIN OR    Anesthesia Start: 0735 Anesthesia Stop:     Procedure: Laparoscopic paraesophageal hernia repair with bioA mesh, laparoscopic gastropexy, extensive lysis of adhesions (Abdomen) Diagnosis:       Paraesophageal hernia      Intra-abdominal adhesions      (Paraesophageal hernia [K44.9])    Surgeons: Sukhjinder Palacios MD Anesthesiologist: Daniela Crawley MD    Anesthesia Type: general ASA Status: 2            Anesthesia Type: general    Vitals Value Taken Time   /64 02/07/24 1016   Temp 97.6 °F (36.4 °C) 02/07/24 1016   Pulse 89 02/07/24 1016   Resp 16 02/07/24 1016   SpO2 100 % 02/07/24 1016   Vitals shown include unfiled device data.    [facility] AN Post Evaluation:   Patient Evaluated in PACU  Patient Participation: complete - patient participated  Level of Consciousness: awake and alert  Pain Score: 0  Pain Management: adequate  Airway Patency:patent  Dental exam unchanged from preop  Yes    Cardiovascular Status: acceptable  Respiratory Status: acceptable and nasal cannula  Postoperative Hydration acceptable  Comments: Resting in PACU.  Dozing easily on NC. Requested PACU RN to administer zofran. Monitor crepitus to left lower lid>left jawline.     Anastacia Ram CRNA  2/7/2024 10:17 AM

## 2024-02-07 NOTE — OPERATIVE REPORT
[facility]     Operative Report  Patient Name:  Vijaya Shipley  MR:  [id number]  :  3/21/1950  DOS:  24    Preop Dx:  Paraesophageal hernia [K44.9]  Postop Dx:  Paraesophageal hernia [K44.9]  Procedure:    Laparoscopic Paraesophageal Hernia repair with BioA mesh  2.   Laparoscopic Gastropexy  3.   Laparoscopic extensive lysis of adhesions    Surgeon:  Sukhjinder Palacios MD  Surgical Assistant.: Jesse Galvan CSA, Lilly Rios MD  EBL: 5 ml  Specimen:  hernia sac  Complication:  None    INDICATION:  Pt is a 73 year old female who with a symptomatic paraesophageal hernia who is scheduled for a Laparoscopic paraesophageal hernia repair with bioA mesh, laparoscopic gastropexy, extensive lysis of adhesions.     CONSENT:  An informed consent discussion was held with the patient regarding the nature of paraesophageal hernias and related symptoms, the treatment options and the details of the procedure.  The risks including but not limited to bleeding, wound infection, intra-abdominal infection, injury to the liver, colon, small intestine, pancreas, stomach, spleen, esophagus, vagus nerve, dysphagia, mesh erosion, recurrent GERD, paraesophageal hernia recurrence, internal and incisional hernia were discussed.  The patient expressed understanding and want to proceed with the planned procedures.     TECHNIQUE:  The patient was taken to the OR and placed in supine position.  General anesthesia was established and she was placed in a modified Tajik lithotomy position.  The abdomen was prepped in standard fashion.  Pneumoperitoneum was obtained using Veress needle technique through a left subcostal incision.  A 12-mm trocar was inserted under direct visualization and no injury occurred.  Examination of the abdomen showed adhesions throughout the abdomen from her prior surgery.  Two 5 mm trocars were placed in the right abdomen.  I performed adhesiolysis using sharp dissection to free the small bowel  adhesions to the abdominal wall.  The adhesions were extensive.  Once adhesiolysis was complete, two 5-mm additional trocars were placed in the left and right abdomen.  A Yeyo retractor was placed in the subxiphoid area to retract the left lateral lobe of the liver.  The patient was placed in reverse Trendelenburg position.  The esophageal hiatus was identified and a hiatal hernia was seen measuring approximately 5 cm.  A portion of the left lateral lobe of the liver was seen herniated consistent with a type IV hiatal hernia.  Gentle retraction was used to reduce the stomach and liver into the abdominal cavity.  We incised the peritoneum at the angle of His.  The incision was carried anteriorly along the phrenoesophageal ligament.  The lesser omentum was incised.  Circumferential dissection of the distal esophagus was completed.  We used a Penrose drain to assist in retracting the distal esophagus and GEJ.  We lengthened the intra-abdominal esophagus to approximately 4 cm.  Two posterior and one anterior cruroplasties using 0 surgidac were placed to primarily close the hiatal hernia.  The pillars appeared weak and I made the decision to reinforce the primary repair with the BioA mesh.  We placed a U shaped bioA mesh to reinforce the posterior aspect the the esophageal hiatus.  The mesh was secured in place using 3-0 silk.  Care was taken to leave a rim of sofia between the medial edge of the mesh and the esophagus to minimize the risk of esophageal erosion.  The mesh laid flat on the diaphragm and appeared to be adequate.       To perform the gastropexy, sutured the greater curvature of the fundus to the anterior abdominal wall using 0 surgidac.  The gastropexy appeared adequate to secure the stomach within the peritoneal cavity.      The abdominal cavity was irrigated with saline and found to be hemostatic.  The trocars were removed under direct visualization and no port site bleeding was seen.  The fascia at  the 12 mm trocar sites were closed using 0 vicryl.  The skin incisions were closed using 4-0 monocryl.  Sterile dressings were applied.  All instrument and sponge counts were correct.  I was present during the critical portions of the procedure.     Sukhjinder Palacios MD

## 2024-02-07 NOTE — ANESTHESIA PROCEDURE NOTES
Peripheral IV  Date/Time: 2/7/2024 7:50 AM  Inserted by: Anastacia Ram CRNA    Placement  Needle size: 20 G  Laterality: right  Location: hand  Local anesthetic: under anesthesia.  Site prep: alcohol  Technique: anatomical landmarks  Attempts: 1

## 2024-02-07 NOTE — ANESTHESIA PREPROCEDURE EVALUATION
Anesthesia PreOp Note    HPI:     Vijaya Shipley is a 73 year old female who presents for preoperative consultation requested by: Sukhjinder Palacios MD    Date of Surgery: 2/7/2024    Procedure(s):  Laparoscopic paraesophageal hernia repair with bioA mesh, gastropexy.  Laparoscopic gastrectomy  Indication: Paraesophageal hernia [K44.9]    Relevant Problems   No relevant active problems       NPO:  Last Liquid Consumption Date: 02/06/24  Last Liquid Consumption Time: 2330  Last Solid Consumption Date: 02/06/24  Last Solid Consumption Time: 1900  Last Liquid Consumption Date: 02/06/24          History Review:  Patient Active Problem List    Diagnosis Date Noted    Paraesophageal hernia 06/13/2023    Non-seasonal allergic rhinitis due to pollen 07/24/2019    Gastroesophageal reflux disease without esophagitis 08/29/2017       Past Medical History:   Diagnosis Date    Allergic rhinitis     Cataract     Esophageal reflux 2017    Hiatal hernia     Ovarian cancer (HCC) 2003    s/p surgery alone (total abdominal hysterectomy with bilateral salpingo oopherectomy)    Visual impairment     readers       Past Surgical History:   Procedure Laterality Date    COLONOSCOPY  2017    EGD  2017    HERNIA SURGERY      HYSTERECTOMY      OTHER      removal of the ovaries    OTHER SURGICAL HISTORY  2014    septoplasty , dr contreras     OTHER SURGICAL HISTORY      deviated septum -- rhinoplasty    TOTAL ABDOM HYSTERECTOMY         Medications Prior to Admission   Medication Sig Dispense Refill Last Dose    calcium carbonate 500 MG Oral Chew Tab Chew 1 tablet (500 mg total) by mouth daily.   2/5/2024    famotidine 40 MG Oral Tab TAKE 1 TABLET BY MOUTH EVERY DAY (Patient taking differently: every evening. TAKE 1 TABLET BY MOUTH EVERY DAY) 90 tablet 3 Past Week    LEVOCETIRIZINE 5 MG Oral Tab TAKE 1 TABLET BY MOUTH EVERY DAY IN THE EVENING 90 tablet 1 Past Week     Current Facility-Administered Medications Ordered in Epic   Medication Dose Route  Frequency Provider Last Rate Last Admin    lactated ringers infusion   Intravenous Continuous Sukhjinder Palacios MD 20 mL/hr at 02/07/24 0640 New Bag at 02/07/24 0640    ceFAZolin (Ancef) 2 g in 20mL IV syringe premix  2 g Intravenous Once Sukhjinder Palacios MD         No current [facility]-ordered outpatient medications on file.       Allergies   Allergen Reactions    Seasonal        Family History   Problem Relation Age of Onset    Cancer Mother 57        cervical    Cancer Father 72        lung cancer;tobacco use    Cancer Sister 79        CLL    Crohn's Disease Daughter     Bleeding Disorders Neg     DVT/VTE Neg      Social History     Socioeconomic History    Marital status:    Tobacco Use    Smoking status: Never     Passive exposure: Never    Smokeless tobacco: Never   Vaping Use    Vaping Use: Never used   Substance and Sexual Activity    Alcohol use: No    Drug use: No   Other Topics Concern    Breast feeding No    Reaction to local anesthetic No    Pt has a pacemaker No    Pt has a defibrillator No       Available pre-op labs reviewed.  Lab Results   Component Value Date    WBC 10.7 12/18/2023    RBC 4.71 12/18/2023    HGB 14.6 12/18/2023    HCT 43.4 12/18/2023    MCV 92.1 12/18/2023    MCH 31.0 12/18/2023    MCHC 33.6 12/18/2023    RDW 12.5 12/18/2023    .0 12/18/2023     Lab Results   Component Value Date     12/28/2023    K 3.9 12/28/2023     12/28/2023    CO2 30.0 12/28/2023    BUN 16 12/28/2023    CREATSERUM 0.92 12/28/2023    GLU 91 12/28/2023    CA 9.8 12/28/2023          Vital Signs:  Body mass index is 24.12 kg/m².   height is 1.588 m (5' 2.5\") and weight is 60.8 kg (134 lb). Her oral temperature is 98.2 °F (36.8 °C). Her blood pressure is 151/65 and her pulse is 102. Her respiration is 20 and oxygen saturation is 97%.   Vitals:    02/05/24 1239 02/07/24 0628   BP:  151/65   Pulse:  102   Resp:  20   Temp:  98.2 °F (36.8 °C)   TempSrc:  Oral   SpO2:  97%   Weight: 60.8 kg (134 lb) 60.8 kg (134  lb)   Height: 1.575 m (5' 2\") 1.588 m (5' 2.5\")        Anesthesia Evaluation     Patient summary reviewed and Nursing notes reviewed    Airway   Mallampati: II  TM distance: >3 FB  Neck ROM: full  Dental          Pulmonary - normal exam   Cardiovascular - normal exam  Exercise tolerance: good    NYHA Classification: I  ECG reviewed    Neuro/Psych    (+)  neuromuscular disease,        GI/Hepatic/Renal    (+) GERD well controlled    Endo/Other    (-) diabetes mellitus    Comments:    Assessment & Plan:   Vijaya Shipley is a 73 year old female who presents for a Medicare Assessment.      1. Medicare annual wellness visit, subsequent (Primary)  2. Gastroesophageal reflux disease without esophagitis controlled with diet and intake of PPI periodically  -     CBC With Differential With Platelet; Future; Expected date: 04/26/2023  3. Bruit of left carotid artery carotid Doppler ultrasound ordered  -     US CAROTID DOPPLER BILAT - DIAG IMG (CPT=93880); Future; Expected date: 04/26/2023  -     Cancel: Basic Metabolic Panel (8); Future; Expected date: 04/26/2023  -     Lipid Panel; Future; Expected date: 04/26/2023  4. White coat syndrome without diagnosis of hypertension patient will continue to monitor blood pressure at home and return if it stays elevated  5. Breast cancer screening by mammogram  -     [facility] NAGI 2D+3D SCREENING BILAT (CPT=77067/78603); Future; Expected date: 04/26/2023  6. Other hyperlipidemia continue low-cholesterol diet, will check lipids  -     Cancel: Basic Metabolic Panel (8); Future; Expected date: 04/26/2023  -     Comp Metabolic Panel (14); Future; Expected date: 04/26/2023  -     Lipid Panel; Future; Expected date: 04/26/2023  -     CBC With Differential With Platelet; Future; Expected date: 04/26/2023  7. Non-seasonal allergic rhinitis due to pollen controlled by taking Xyzal       Abdominal  - normal exam                 Anesthesia Plan:   ASA:  2  Plan:   General  Monitors and Lines:    Additonal IV and BIS  Airway:  ETT  Informed Consent Plan and Risks Discussed With:  Patient and spouse  Discussed plan with:  CRNA, attending and surgeon  Provider Attestation (if preop done by other):  GA/ETT/PONV,dental damages etc      I have informed Vijaya Shipley and/or legal guardian or family member of the nature of the anesthetic plan, benefits, risks including possible dental damage if relevant, major complications, and any alternative forms of anesthetic management.   All of the patient's questions were answered to the best of my ability. The patient desires the anesthetic management as planned.  PERRI DEE MD  2/7/2024 7:14 AM  Present on Admission:  **None**

## 2024-02-07 NOTE — PLAN OF CARE
S/p today, x6 abdominal lap sites with abdominal binder/ice pack, tolerating small amount clear liquids, dilaudid for pain control, up with standby assist, voiding freely, no gas,   Will be npo at midnight for xr UGI esophagus.  Patient and  updated on care plan.

## 2024-02-07 NOTE — ADDENDUM NOTE
Addendum  created 02/07/24 1023 by Anastacia Ram CRNA    Clinical Note Signed, Intraprocedure Meds edited

## 2024-02-07 NOTE — CONSULTS
[facility]    PATIENT'S NAME: ZULEIKA MATHUR   ATTENDING PHYSICIAN: Sukhjinder Palacios MD   CONSULTING PHYSICIAN: Cara Quiñones MD   PATIENT ACCOUNT#:   [id number]    LOCATION:  Critical access hospital PACU 6 [facility] 10  MEDICAL RECORD #:   [id number]       YOB: 1950  ADMISSION DATE:       02/07/2024      CONSULT DATE:  02/07/2024    REPORT OF CONSULTATION    REASON FOR CONSULTATION:  Post paraesophageal hernia repair.    HISTORY OF PRESENT ILLNESS:  The patient is a 73-year-old  female with chronic gastroesophageal reflux disease and underlying large paraesophageal right-sided hernia.  She has exhausted outpatient conservative medical management options.  Scheduled today for above-mentioned procedure by her surgeon, Dr. Sukhjinder Palacios.  Postoperatively transferred to PACU for further monitoring.    PAST MEDICAL HISTORY:  Gastroesophageal reflux disease with large right paraesophageal hernia 10 cm in diameter.    PAST SURGICAL HISTORY:  Total abdominal hysterectomy and bilateral salpingo-oophorectomy for ovarian cancer, hernia repair, nasal septoplasty.    MEDICATIONS:  Please see medication reconciliation list.    ALLERGIES:  No known drug allergies.    SOCIAL HISTORY:  No tobacco, alcohol, or drug use.  Lives with her family.  Independent in her basic activities of daily living.    FAMILY HISTORY:  Mother had cervical cancer.  Father had lung cancer.  Sister had chronic lymphocytic leukemia.    REVIEW OF SYSTEMS:  Currently with nausea and some abdominal discomfort.  The patient denies chest pain.  No abdominal pain.  No shortness of breath.  Other 12-point review of systems negative.      PHYSICAL EXAMINATION:    GENERAL:  Alert.  Oriented to time, place, and person.  Mild distress.  VITAL SIGNS:  Temperature 97.6, pulse 87, respiratory rate 18, blood pressure 128/71, pulse ox 99% on room air.  HEENT:  Atraumatic.  Oropharynx clear, dry mucous membranes.  NECK:  Supple.  No lymphadenopathy.  Trachea  midline.  Full range of motion.  LUNGS:  Clear to auscultation bilaterally.  Normal respiratory effort.  HEART:  Regular rate and rhythm.  S1, S2 auscultated.  No murmur.  ABDOMEN:  Soft, nondistended.  Laparoscopic incisions.  No complications.  Hypoactive bowel sounds.  EXTREMITIES:  No peripheral edema, clubbing, or cyanosis.  NEUROLOGIC:  Motor and sensory intact.    ASSESSMENT AND PLAN:  Severe gastroesophageal reflux disease with paraesophageal hernia, status post laparoscopic paraesophageal hernia repair with BioMesh; laparoscopic gastropexy; and extensive lysis of adhesions.  Pain control, monitor hemodynamic status, clear liquid diet, DVT prophylaxis.  Further recommendations to follow.    Dictated By Cara Quiñones MD  d: 02/07/2024 10:35:19  t: 02/07/2024 10:54:48  [facility] [id number]  FB/

## 2024-02-08 ENCOUNTER — APPOINTMENT (OUTPATIENT)
Dept: GENERAL RADIOLOGY | Facility: HOSPITAL | Age: 74
End: 2024-02-08
Attending: SURGERY
Payer: MEDICARE

## 2024-02-08 ENCOUNTER — APPOINTMENT (OUTPATIENT)
Dept: GENERAL RADIOLOGY | Facility: HOSPITAL | Age: 74
DRG: 328 | End: 2024-02-08
Attending: SURGERY
Payer: MEDICARE

## 2024-02-08 VITALS
DIASTOLIC BLOOD PRESSURE: 74 MMHG | OXYGEN SATURATION: 97 % | BODY MASS INDEX: 24.04 KG/M2 | TEMPERATURE: 98 F | HEIGHT: 62.5 IN | WEIGHT: 134 LBS | SYSTOLIC BLOOD PRESSURE: 138 MMHG | RESPIRATION RATE: 18 BRPM | HEART RATE: 80 BPM

## 2024-02-08 LAB
ANION GAP SERPL CALC-SCNC: 4 MMOL/L (ref 0–18)
BASOPHILS # BLD AUTO: 0.05 X10(3) UL (ref 0–0.2)
BASOPHILS NFR BLD AUTO: 0.4 %
BUN BLD-MCNC: 9 MG/DL (ref 9–23)
BUN/CREAT SERPL: 11.1 (ref 10–20)
CALCIUM BLD-MCNC: 8.7 MG/DL (ref 8.7–10.4)
CHLORIDE SERPL-SCNC: 107 MMOL/L (ref 98–112)
CO2 SERPL-SCNC: 30 MMOL/L (ref 21–32)
CREAT BLD-MCNC: 0.81 MG/DL
DEPRECATED RDW RBC AUTO: 42 FL (ref 35.1–46.3)
EGFRCR SERPLBLD CKD-EPI 2021: 76 ML/MIN/1.73M2 (ref 60–?)
EOSINOPHIL # BLD AUTO: 0.06 X10(3) UL (ref 0–0.7)
EOSINOPHIL NFR BLD AUTO: 0.5 %
ERYTHROCYTE [DISTWIDTH] IN BLOOD BY AUTOMATED COUNT: 12.8 % (ref 11–15)
GLUCOSE BLD-MCNC: 99 MG/DL (ref 70–99)
HCT VFR BLD AUTO: 39.7 %
HGB BLD-MCNC: 13.6 G/DL
IMM GRANULOCYTES # BLD AUTO: 0.04 X10(3) UL (ref 0–1)
IMM GRANULOCYTES NFR BLD: 0.3 %
LYMPHOCYTES # BLD AUTO: 5.97 X10(3) UL (ref 1–4)
LYMPHOCYTES NFR BLD AUTO: 47.4 %
MAGNESIUM SERPL-MCNC: 1.9 MG/DL (ref 1.6–2.6)
MCH RBC QN AUTO: 30.9 PG (ref 26–34)
MCHC RBC AUTO-ENTMCNC: 34.3 G/DL (ref 31–37)
MCV RBC AUTO: 90.2 FL
MONOCYTES # BLD AUTO: 1.02 X10(3) UL (ref 0.1–1)
MONOCYTES NFR BLD AUTO: 8.1 %
NEUTROPHILS # BLD AUTO: 5.46 X10 (3) UL (ref 1.5–7.7)
NEUTROPHILS # BLD AUTO: 5.46 X10(3) UL (ref 1.5–7.7)
NEUTROPHILS NFR BLD AUTO: 43.3 %
OSMOLALITY SERPL CALC.SUM OF ELEC: 291 MOSM/KG (ref 275–295)
PHOSPHATE SERPL-MCNC: 3.6 MG/DL (ref 2.4–5.1)
PLATELET # BLD AUTO: 164 10(3)UL (ref 150–450)
POTASSIUM SERPL-SCNC: 3.9 MMOL/L (ref 3.5–5.1)
RBC # BLD AUTO: 4.4 X10(6)UL
SODIUM SERPL-SCNC: 141 MMOL/L (ref 136–145)
WBC # BLD AUTO: 12.6 X10(3) UL (ref 4–11)

## 2024-02-08 PROCEDURE — 99239 HOSP IP/OBS DSCHRG MGMT >30: CPT | Performed by: HOSPITALIST

## 2024-02-08 PROCEDURE — 74240 X-RAY XM UPR GI TRC 1CNTRST: CPT | Performed by: SURGERY

## 2024-02-08 NOTE — DISCHARGE SUMMARY
[place] Hospitalist Discharge Summary   Patient ID:  Vijaya Shipley  [id number]  73 year old  3/21/1950    Admit date: 2/7/2024  Discharge date: 2/8/2024  Primary Care Physician: Erin Chao MD   Attending Physician: Anya Ramos MD   Consults:   Consultants         Provider   Role Specialty     Sukhjinder Palacios MD  Consulting Physician SURGERY, GENERAL     Cara Quiñones MD  Consulting Physician HOSPITALIST            Discharge Diagnoses:   Paraesophageal hernia    Reason for admission  Copied from admission H&P: please refer to preop H&P     Hospital Course:  Severe Gastroesophageal reflux disease. With paraesophageal hernia  - s/p laparoscopic paraesophageal hernia repair with biomesh, laparoscopic gastropexy and extensive HERBER.   - Pain control.   - Upper GI done and reviewed.   - diet advanced to soft diet and tolerating well.   - Pt up walking halls and passing gas.   - Ok to discharge home today       EXAM:   GENERAL: no apparent distress, comfortable  NEURO: A/A Ox3, no focal deficits  RESP: non labored, CTAB/L  CARDIO: Regular, no murmur  ABD: soft, NT, ND  EXTREMITIES: no edema, no calf tenderness    Operative Procedures: Procedure(s) (LRB):  Laparoscopic paraesophageal hernia repair with bioA mesh, laparoscopic gastropexy, extensive lysis of adhesions (N/A)    Discharge Instructions     Medication List        CHANGE how you take these medications      famotidine 40 MG Tabs  Commonly known as: Pepcid  TAKE 1 TABLET BY MOUTH EVERY DAY  What changed: when to take this            CONTINUE taking these medications      calcium carbonate 500 MG Chew  Commonly known as: Tums     levocetirizine 5 MG Tabs  Commonly known as: Xyzal  TAKE 1 TABLET BY MOUTH EVERY DAY IN THE EVENING              Activity: activity as tolerated  Diet: regular diet  Wound Care: NA  Code Status: Full Code        Discharge Instructions         Home Care Instructions    LAPAROSCOPIC PARAESOPHAGEAL HERNIA REPAIR    1. You have  incisions with absorbable sutures underneath the skin so no suture removal are needed.      2. You can shower the day after surgery.  The incisions can get wet with water and soap.  Just pat your incisions dry after showering.  Avoid soaking in a bath tub for one week.  Avoid heavy lifting (greater than 10 lbs or anything heavier than a gallon of milk) for six weeks after surgery.    3. Be up and around when you are home.  The more you walk, the faster your recovery will be.    4. You may have an abdominal binder (medical girdle).  Wear it when you are up and moving around.  The bottom of the binder should cover a little of your hip bones to provide support to your lower abdomen.  Avoid wearing the binder too high as it may make your discomfort worse.    5. Take pain medications around the clock for the first few days after surgery regardless if you have pain or not.  The pain medications take about 30 minutes to work so if you wait until you experience pain, then you might be uncomfortable during those 30 minutes.    6.  A well known side effect of pain medication is constipation.  It is the number 1, 2, and 3 reason why patients call me after surgery.  Adequate hydration and stool softeners are ways to minimize the risk of constipation after surgery.  Drink a lot of fluid when you are at home.  Check your urine color.  If it's dark, then you are dehydrated and need to drink more water.  Take as many stool softeners (morning, noon, evening) as you need to have about one bowel movement a day.  Don't let four or five days go by without a bowel movement.  If that occurs, then you might need a rectal suppository or an enema to treat the constipation.    7.  You may drive when you are no longer taking prescription pain medications with narcotics.  If your degree of discomfort is minimal, extra strength tylenol alternating with ibuprofen could be used as necessary.    8. Please contact the office ([phone]) to  schedule a clinic visit approximately two weeks after surgery.      9. Signs and symptoms of post-operative problems include abdominal pain associated with nausea and/or vomiting, fever, chills, excessive drainage or pain at the incision sites, leg swelling/pain, or chest pain. Contact our office immediately if these signs or symptoms occur.    10. If you have any problems or questions please contact me at any time day or night.  My cell phone number is [phone].       HOME INSTRUCTIONS  AMBSURG HOME CARE INSTRUCTIONS: POST-OP ANESTHESIA  The medication that you received for sedation or general anesthesia can last up to 24 hours. Your judgment and reflexes may be altered, even if you feel like your normal self.      We Recommend:   Do not drive any motor vehicle or bicycle   Avoid mowing the lawn, playing sports, or working with power tools/applicances (power saws, electric knives or mixers)   That you have someone stay with you on your first night home   Do not drink alcohol or take sleeping pills or tranquilizers   Do not sign legal documents within 24 hours of your procedure   If you had a nerve block for your surgery, take extra care not to put any pressure on your arm or hand for 24 hours    It is normal:  For you to have a sore throat if you had a breathing tube during surgery (while you were asleep!). The sore throat should get better within 48 hours. You can gargle with warm salt water (1/2 tsp in 4 oz warm water) or use a throat lozenge for comfort  To feel muscle aches or soreness especially in the abdomen, chest or neck. The achy feeling should go away in the next 24 hours  To feel weak, sleepy or \"wiped out\". Your should start feeling better in the next 24 hours.   To experience mild discomforts such as sore lip or tongue, headache, cramps, gas pains or a bloated feeling in your abdomen.   To experience mild back pain or soreness for a day or two if you had spinal or epidural anesthesia.   If you had  laparoscopic surgery, to feel shoulder pain or discomfort on the day of surgery.   For some patients to have nausea after surgery/anesthesia    If you feel nausea or experience vomiting:   Try to move around less.   Eat less than usual or drink only liquids until the next morning   Nausea should resolve in about 24 hours    If you have a problem when you are at home:    Call your surgeons office   Discharge Instructions: After Your Surgery  You’ve just had surgery. During surgery, you were given medicine called anesthesia to keep you relaxed and free of pain. After surgery, you may have some pain or nausea. This is common. Here are some tips for feeling better and getting well after surgery.   Going home  Your healthcare provider will show you how to take care of yourself when you go home. They'll also answer your questions. Have an adult family member or friend drive you home. For the first 24 hours after your surgery:   Don't drive or use heavy equipment.  Don't make important decisions or sign legal papers.  Take medicines as directed.  Don't drink alcohol.  Have someone stay with you, if needed. They can watch for problems and help keep you safe.  Be sure to go to all follow-up visits with your healthcare provider. And rest after your surgery for as long as your provider tells you to.   Coping with pain  If you have pain after surgery, pain medicine will help you feel better. Take it as directed, before pain becomes severe. Also, ask your healthcare provider or pharmacist about other ways to control pain. This might be with heat, ice, or relaxation. And follow any other instructions your surgeon or nurse gives you.      Stay on schedule with your medicine.     Tips for taking pain medicine  To get the best relief possible, remember these points:   Pain medicines can upset your stomach. Taking them with a little food may help.  Most pain relievers taken by mouth need at least 20 to 30 minutes to start to  work.  Don't wait till your pain becomes severe before you take your medicine. Try to time your medicine so that you can take it before starting an activity. This might be before you get dressed, go for a walk, or sit down for dinner.  Constipation is a common side effect of some pain medicines. Call your healthcare provider before taking any medicines such as laxatives or stool softeners to help ease constipation. Also ask if you should skip any foods. Drinking lots of fluids and eating foods such as fruits and vegetables that are high in fiber can also help. Remember, don't take laxatives unless your surgeon has prescribed them.  Drinking alcohol and taking pain medicine can cause dizziness and slow your breathing. It can even be deadly. Don't drink alcohol while taking pain medicine.  Pain medicine can make you react more slowly to things. Don't drive or run machinery while taking pain medicine.  Your healthcare provider may tell you to take acetaminophen to help ease your pain. Ask them how much you're supposed to take each day. Acetaminophen or other pain relievers may interact with your prescription medicines or other over-the-counter (OTC) medicines. Some prescription medicines have acetaminophen and other ingredients in them. Using both prescription and OTC acetaminophen for pain can cause you to accidentally overdose. Read the labels on your OTC medicines with care. This will help you to clearly know the list of ingredients, how much to take, and any warnings. It may also help you not take too much acetaminophen. If you have questions or don't understand the information, ask your pharmacist or healthcare provider to explain it to you before you take the OTC medicine.   Managing nausea  Some people have an upset stomach (nausea) after surgery. This is often because of anesthesia, pain, or pain medicine, less movement of food in the stomach, or the stress of surgery. These tips will help you handle nausea and  eat healthy foods as you get better. If you were on a special food plan before surgery, ask your healthcare provider if you should follow it while you get better. Check with your provider on how your eating should progress. It may depend on the surgery you had. These general tips may help:   Don't push yourself to eat. Your body will tell you when to eat and how much.  Start off with clear liquids and soup. They're easier to digest.  Next try semi-solid foods as you feel ready. These include mashed potatoes, applesauce, and gelatin.  Slowly move to solid foods. Don’t eat fatty, rich, or spicy foods at first.  Don't force yourself to have 3 large meals a day. Instead eat smaller amounts more often.  Take pain medicines with a small amount of solid food, such as crackers or toast. This helps prevent nausea.  When to call your healthcare provider  Call your healthcare provider right away if you have any of these:   You still have too much pain, or the pain gets worse, after taking the medicine. The medicine may not be strong enough. Or there may be a complication from the surgery.  You feel too sleepy, dizzy, or groggy. The medicine may be too strong.  Side effects such as nausea or vomiting. Your healthcare provider may advise taking other medicines to .  Skin changes such as rash, itching, or hives. This may mean you have an allergic reaction. Your provider may advise taking other medicines.  The incision looks different (for instance, part of it opens up).  Bleeding or fluid leaking from the incision site, and weren't told to expect that.  Fever of 100.4°F (38°C) or higher, or as directed by your provider.  Call 911  Call 911 right away if you have:   Trouble breathing  Facial swelling    If you have obstructive sleep apnea   You were given anesthesia medicine during surgery to keep you comfortable and free of pain. After surgery, you may have more apnea spells because of this medicine and other medicines you were  given. The spells may last longer than normal.    At home:  Keep using the continuous positive airway pressure (CPAP) device when you sleep. Unless your healthcare provider tells you not to, use it when you sleep, day or night. CPAP is a common device used to treat obstructive sleep apnea.  Talk with your provider before taking any pain medicine, muscle relaxants, or sedatives. Your provider will tell you about the possible dangers of taking these medicines.  Contact your provider if your sleeping changes a lot even when taking medicines as directed.  [employer] last reviewed this educational content on 10/1/2021  © 2865-2701 [employer]. All rights reserved. This information is not intended as a substitute for professional medical care. Always follow your healthcare professional's instructions.                Important follow up:   Follow-up Information       Sukhjinder Palacios MD. Call in 2 week(s).    Specialty: SURGERY, GENERAL  Why: Post operative clinic visit  Contact information:  [address]  [facility] [address]  [phone]                             -PCP in [] within 7 days [] within 14 days [] other     Disposition: home  Discharged Condition: good    Hospital Discharge Diagnoses:  hernia repair    Lace+ Score: 25  59-90 High Risk  29-58 Medium Risk  0-28   Low Risk.    TCM Follow-Up Recommendation:  LACE < 29: Low Risk of readmission after discharge from the hospital. No TCM follow-up needed.            Total Time Coordinating Care: Greater than 30 minutes    Patient had opportunity to ask questions, state understanding, and agree with therapeutic plan as outlined    Anya Ramos MD  Hospitalist  2/8/2024

## 2024-02-08 NOTE — PLAN OF CARE
Vijaya is AxO4. POD 1. Lap sites are clean/dry/intact. Abdominal binder in place. Pain managed with tylenol and icepacks. Denies nausea/vomiting overnight. Receiving IVF. Ambulating with SBA. Voiding freely. No gas. No BM overnight. NPO since 0000 for XR UGI today. Appropriate safety measures maintained.   Problem: Patient Centered Care  Goal: Patient preferences are identified and integrated in the patient's plan of care  Description: Interventions:  - What would you like us to know as we care for you?   - Provide timely, complete, and accurate information to patient/family  - Incorporate patient and family knowledge, values, beliefs, and cultural backgrounds into the planning and delivery of care  - Encourage patient/family to participate in care and decision-making at the level they choose  - Honor patient and family perspectives and choices  Outcome: Progressing     Problem: Patient/Family Goals  Goal: Patient/Family Long Term Goal  Description: Patient's Long Term Goal:     Interventions:  -   - See additional Care Plan goals for specific interventions  Outcome: Progressing  Goal: Patient/Family Short Term Goal  Description: Patient's Short Term Goal:     Interventions:   -   - See additional Care Plan goals for specific interventions  Outcome: Progressing     Problem: SKIN/TISSUE INTEGRITY - ADULT  Goal: Incision(s), wounds(s) or drain site(s) healing without S/S of infection  Description: INTERVENTIONS:  - Assess and document risk factors for pressure ulcer development  - Assess and document skin integrity  - Assess and document dressing/incision, wound bed, drain sites and surrounding tissue  - Implement wound care per orders  - Initiate isolation precautions as appropriate  - Initiate Pressure Ulcer prevention bundle as indicated  Outcome: Progressing     Problem: PAIN - ADULT  Goal: Verbalizes/displays adequate comfort level or patient's stated pain goal  Description: INTERVENTIONS:  - Encourage pt to  monitor pain and request assistance  - Assess pain using appropriate pain scale  - Administer analgesics based on type and severity of pain and evaluate response  - Implement non-pharmacological measures as appropriate and evaluate response  - Consider cultural and social influences on pain and pain management  - Manage/alleviate anxiety  - Utilize distraction and/or relaxation techniques  - Monitor for opioid side effects  - Notify MD/LIP if interventions unsuccessful or patient reports new pain  - Anticipate increased pain with activity and pre-medicate as appropriate  Outcome: Progressing     Problem: RISK FOR INFECTION - ADULT  Goal: Absence of fever/infection during anticipated neutropenic period  Description: INTERVENTIONS  - Monitor WBC  - Administer growth factors as ordered  - Implement neutropenic guidelines  Outcome: Progressing     Problem: SAFETY ADULT - FALL  Goal: Free from fall injury  Description: INTERVENTIONS:  - Assess pt frequently for physical needs  - Identify cognitive and physical deficits and behaviors that affect risk of falls.  - [place] fall precautions as indicated by assessment.  - Educate pt/family on patient safety including physical limitations  - Instruct pt to call for assistance with activity based on assessment  - Modify environment to reduce risk of injury  - Provide assistive devices as appropriate  - Consider OT/PT consult to assist with strengthening/mobility  - Encourage toileting schedule  Outcome: Progressing     Problem: DISCHARGE PLANNING  Goal: Discharge to home or other facility with appropriate resources  Description: INTERVENTIONS:  - Identify barriers to discharge w/pt and caregiver  - Include patient/family/discharge partner in discharge planning  - Arrange for needed discharge resources and transportation as appropriate  - Identify discharge learning needs (meds, wound care, etc)  - Arrange for interpreters to assist at discharge as needed  - Consider  post-discharge preferences of patient/family/discharge partner  - Complete POLST form as appropriate  - Assess patient's ability to be responsible for managing their own health  - Refer to Case Management Department for coordinating discharge planning if the patient needs post-hospital services based on physician/LIP order or complex needs related to functional status, cognitive ability or social support system  Outcome: Progressing

## 2024-02-09 ENCOUNTER — TELEPHONE (OUTPATIENT)
Dept: SURGERY | Facility: CLINIC | Age: 74
End: 2024-02-09

## 2024-02-09 ENCOUNTER — PATIENT OUTREACH (OUTPATIENT)
Dept: CASE MANAGEMENT | Age: 74
End: 2024-02-09

## 2024-02-09 NOTE — TELEPHONE ENCOUNTER
Patient called to schedule 2 weeks post op appt but next opening is 2/27 Patient would like to know if that's ok with Dr Sukhjinder Palacios.Please advise

## 2024-02-11 PROCEDURE — 99284 EMERGENCY DEPT VISIT MOD MDM: CPT

## 2024-02-11 PROCEDURE — 36415 COLL VENOUS BLD VENIPUNCTURE: CPT

## 2024-02-11 PROCEDURE — 99285 EMERGENCY DEPT VISIT HI MDM: CPT

## 2024-02-12 ENCOUNTER — TELEPHONE (OUTPATIENT)
Dept: SURGERY | Facility: CLINIC | Age: 74
End: 2024-02-12

## 2024-02-12 ENCOUNTER — APPOINTMENT (OUTPATIENT)
Dept: CT IMAGING | Facility: HOSPITAL | Age: 74
End: 2024-02-12
Attending: EMERGENCY MEDICINE
Payer: MEDICARE

## 2024-02-12 ENCOUNTER — TELEPHONE (OUTPATIENT)
Dept: INTERNAL MEDICINE CLINIC | Facility: CLINIC | Age: 74
End: 2024-02-12

## 2024-02-12 ENCOUNTER — HOSPITAL ENCOUNTER (EMERGENCY)
Facility: HOSPITAL | Age: 74
Discharge: HOME | End: 2024-02-12
Attending: EMERGENCY MEDICINE
Payer: MEDICARE

## 2024-02-12 VITALS
TEMPERATURE: 98 F | OXYGEN SATURATION: 95 % | BODY MASS INDEX: 24.66 KG/M2 | DIASTOLIC BLOOD PRESSURE: 80 MMHG | WEIGHT: 134 LBS | HEIGHT: 62 IN | SYSTOLIC BLOOD PRESSURE: 167 MMHG | RESPIRATION RATE: 18 BRPM | HEART RATE: 85 BPM

## 2024-02-12 DIAGNOSIS — K59.00 CONSTIPATION, UNSPECIFIED CONSTIPATION TYPE: Primary | ICD-10-CM

## 2024-02-12 LAB
ALBUMIN SERPL-MCNC: 4.1 G/DL (ref 3.2–4.8)
ALP LIVER SERPL-CCNC: 76 U/L
ALT SERPL-CCNC: 29 U/L
ANION GAP SERPL CALC-SCNC: 7 MMOL/L (ref 0–18)
AST SERPL-CCNC: 24 U/L (ref ?–34)
BASOPHILS # BLD AUTO: 0.05 X10(3) UL (ref 0–0.2)
BASOPHILS NFR BLD AUTO: 0.5 %
BILIRUB DIRECT SERPL-MCNC: 0.2 MG/DL (ref ?–0.3)
BILIRUB SERPL-MCNC: 0.6 MG/DL (ref 0.2–1.1)
BUN BLD-MCNC: 9 MG/DL (ref 9–23)
BUN/CREAT SERPL: 11 (ref 10–20)
CALCIUM BLD-MCNC: 9.7 MG/DL (ref 8.7–10.4)
CHLORIDE SERPL-SCNC: 108 MMOL/L (ref 98–112)
CO2 SERPL-SCNC: 26 MMOL/L (ref 21–32)
CREAT BLD-MCNC: 0.82 MG/DL
DEPRECATED RDW RBC AUTO: 41 FL (ref 35.1–46.3)
EGFRCR SERPLBLD CKD-EPI 2021: 75 ML/MIN/1.73M2 (ref 60–?)
EOSINOPHIL # BLD AUTO: 0.18 X10(3) UL (ref 0–0.7)
EOSINOPHIL NFR BLD AUTO: 1.8 %
ERYTHROCYTE [DISTWIDTH] IN BLOOD BY AUTOMATED COUNT: 12.5 % (ref 11–15)
GLUCOSE BLD-MCNC: 105 MG/DL (ref 70–99)
HCT VFR BLD AUTO: 40.5 %
HGB BLD-MCNC: 13.5 G/DL
IMM GRANULOCYTES # BLD AUTO: 0.03 X10(3) UL (ref 0–1)
IMM GRANULOCYTES NFR BLD: 0.3 %
LYMPHOCYTES # BLD AUTO: 4.33 X10(3) UL (ref 1–4)
LYMPHOCYTES NFR BLD AUTO: 44.1 %
MCH RBC QN AUTO: 30.2 PG (ref 26–34)
MCHC RBC AUTO-ENTMCNC: 33.3 G/DL (ref 31–37)
MCV RBC AUTO: 90.6 FL
MONOCYTES # BLD AUTO: 0.5 X10(3) UL (ref 0.1–1)
MONOCYTES NFR BLD AUTO: 5.1 %
NEUTROPHILS # BLD AUTO: 4.72 X10 (3) UL (ref 1.5–7.7)
NEUTROPHILS # BLD AUTO: 4.72 X10(3) UL (ref 1.5–7.7)
NEUTROPHILS NFR BLD AUTO: 48.2 %
OSMOLALITY SERPL CALC.SUM OF ELEC: 291 MOSM/KG (ref 275–295)
PLATELET # BLD AUTO: 186 10(3)UL (ref 150–450)
POTASSIUM SERPL-SCNC: 3.7 MMOL/L (ref 3.5–5.1)
PROT SERPL-MCNC: 6.6 G/DL (ref 5.7–8.2)
RBC # BLD AUTO: 4.47 X10(6)UL
SODIUM SERPL-SCNC: 141 MMOL/L (ref 136–145)
WBC # BLD AUTO: 9.8 X10(3) UL (ref 4–11)

## 2024-02-12 PROCEDURE — 71260 CT THORAX DX C+: CPT | Performed by: EMERGENCY MEDICINE

## 2024-02-12 PROCEDURE — 80048 BASIC METABOLIC PNL TOTAL CA: CPT | Performed by: EMERGENCY MEDICINE

## 2024-02-12 PROCEDURE — 74176 CT ABD & PELVIS W/O CONTRAST: CPT | Performed by: EMERGENCY MEDICINE

## 2024-02-12 PROCEDURE — 80076 HEPATIC FUNCTION PANEL: CPT | Performed by: EMERGENCY MEDICINE

## 2024-02-12 PROCEDURE — 85025 COMPLETE CBC W/AUTO DIFF WBC: CPT | Performed by: EMERGENCY MEDICINE

## 2024-02-12 RX ORDER — MAGNESIUM CARB/ALUMINUM HYDROX 105-160MG
148 TABLET,CHEWABLE ORAL ONCE
Qty: 148 ML | Refills: 0 | Status: SHIPPED | OUTPATIENT
Start: 2024-02-12 | End: 2024-02-12

## 2024-02-12 NOTE — ED INITIAL ASSESSMENT (HPI)
Patient presents ambulatory to the ED c/o constipation following hernia repair 02/07/24. Pt stating she took \"a lot\" of stool softeners and laxatives without any success. Took miralx for first time today around 2200 and had possible allergic reaction stating \"my mouth swelled up'. Per pt took benadryl PTA. Pt presents with slight swelling to left lower lip. Speaking in full sentences and swallowing all secretions well with no signs or symptoms of respiratory distress noted.

## 2024-02-12 NOTE — TELEPHONE ENCOUNTER
I called the patient and she is feeling better today. After taking Miralax and after she had lip swelling and facial swelling. Went to ER because of this and inability to have BM.     Patient had imaging done, she has since has finally had a good bowel movement this morning.     Patient says her b/p was good this morning, no concerns. She was anxious yesterday which is likely what made it go up.     Overall feeling better and no complaints. Just wanted PCP to know what happen and ask her to review records from visit is needed.

## 2024-02-12 NOTE — TELEPHONE ENCOUNTER
Patient called to update . Per patient she had her surgery on 02/07/2024 and she is recovering well. She wanted to let  know she went to the ER because she has a reaction to a medication she took, she states she had lip swelling and her blood pressure went up.

## 2024-02-12 NOTE — ED PROVIDER NOTES
Patient Seen in: [facility] Emergency Department      History     Chief Complaint   Patient presents with    Postop/Procedure Problem    Constipation    Allergic Rxn Allergies     Stated Complaint: Post-OP Symptoms    Subjective:   HPI    73-year-old female with laparoscopic paraesophageal hernia repair by general surgery here last week now with constipation symptoms.  She has not had a normal bowel movement.  She is taken multiple over-the-counter stool softeners and laxatives without relief.  She believes she may have reacted to the MiraLAX earlier with some lip swelling.  This is slowly gotten better after some Benadryl and ice.  She denies fever.  No nausea or vomiting.  She is adhering to her prescribed diet.  She did give herself an enema but states it was not completely effective and she passed a small amount of stool.    Objective:   Past Medical History:   Diagnosis Date    Allergic rhinitis     Cataract     Esophageal reflux 2017    Hiatal hernia     Ovarian cancer (HCC) 2003    s/p surgery alone (total abdominal hysterectomy with bilateral salpingo oopherectomy)    Visual impairment     readers              Past Surgical History:   Procedure Laterality Date    COLONOSCOPY  2017    EGD  2017    HERNIA SURGERY      HYSTERECTOMY      OTHER      removal of the ovaries    OTHER SURGICAL HISTORY  2014    septoplasty , dr contreras     OTHER SURGICAL HISTORY      deviated septum -- rhinoplasty    TOTAL ABDOM HYSTERECTOMY                  Social History     Socioeconomic History    Marital status:    Tobacco Use    Smoking status: Never     Passive exposure: Never    Smokeless tobacco: Never   Vaping Use    Vaping Use: Never used   Substance and Sexual Activity    Alcohol use: No    Drug use: No   Other Topics Concern    Breast feeding No    Reaction to local anesthetic No    Pt has a pacemaker No    Pt has a defibrillator No   Social History Narrative    Vijaya is  to Jamey. She has 3 adult  children. Patient is retired from being a . She lives with her  in [place], [place].     Social Determinants of Health     Food Insecurity: No Food Insecurity (2/7/2024)    Food Insecurity     Food Insecurity: Never true   Transportation Needs: No Transportation Needs (2/7/2024)    Transportation Needs     Lack of Transportation: No   Housing Stability: Low Risk  (2/7/2024)    Housing Stability     Housing Instability: No              Review of Systems    Positive for stated complaint: Post-OP Symptoms  Other systems are as noted in HPI.  Constitutional and vital signs reviewed.      All other systems reviewed and negative except as noted above.    Physical Exam     ED Triage Vitals [02/12/24 0003]   BP (!) 161/82   Pulse 89   Resp 19   Temp 98.1 °F (36.7 °C)   Temp src Oral   SpO2 96 %   O2 Device None (Room air)       Current:BP (!) 179/82   Pulse 97   Temp 98.1 °F (36.7 °C) (Oral)   Resp 20   Ht 157.5 cm (5' 2\")   Wt 60.8 kg   SpO2 95%   BMI 24.51 kg/m²         Physical Exam    Constitutional: Oriented to person, place, and time.  Appears well-developed. No distress.   Head: Normocephalic and atraumatic.   Eyes: Conjunctivae are normal. Pupils are equal, round, and reactive to light.   Cardiovascular: Normal rate, regular rhythm and intact distal pulses.    Pulmonary/Chest: Effort normal. No respiratory distress.   Abdominal: Soft.  Mild tenderness in the epigastrium and left upper quadrant.  No guarding or peritoneal signs.  Rectal exam without gross stool in the vault  Musculoskeletal: Normal range of motion. No edema or tenderness.   Neurological: Alert and oriented to person, place, and time.   Skin: Skin is warm and dry.   Nursing note and vitals reviewed.    Differential diagnosis includes constipation, ileus.      ED Course     Labs Reviewed   BASIC METABOLIC PANEL (8) - Abnormal; Notable for the following components:       Result Value    Glucose 105 (*)     All other components  within normal limits   CBC W/ DIFFERENTIAL - Abnormal; Notable for the following components:    Lymphocyte Absolute 4.33 (*)     All other components within normal limits   HEPATIC FUNCTION PANEL (7) - Normal   CBC WITH DIFFERENTIAL WITH PLATELET    Narrative:     The following orders were created for panel order CBC With Differential With Platelet.  Procedure                               Abnormality         Status                     ---------                               -----------         ------                     CBC W/ DIFFERENTIAL[[id number]]          Abnormal            Final result                 Please view results for these tests on the individual orders.             CT ABDOMEN PELVIS WITHOUT CONTRAST    COMPARISON: None    IMPRESSION:    There is a right paraesophageal observation that may be mediastinal collection or possibly a medial right lower lobe consolidation with cavitation.  Chest CT with contrast and water-soluble contrast swallow recommended for further evaluation.    Small foci of soft tissue gas in the abdominal wall and lower chest wall are likely postoperative.    Large volume retained barium contrast within the colon causes artifact which limits evaluation of the abdomen and pelvis.  There are small bilateral inguinal hernias, containing colon on the left small bowel in the right.  No definite colitis.  Normal appendix.  There is a small bowel anastomosis in the right lower quadrant.  No definite dilated bowel loops to indicate obstruction.   No obstructive uropathy.    Case discussed with Dr. James at 0227 EST.  If there are any questions please feel free to contact me directly at [phone], ext [phone]. If you cannot reach me at this number, do not leave a voicemail. Please call [phone] ext [phone] and ask for the next available radiologist.      Sukumar Gomes M.D.  This report has been electronically signed and verified by the Radiologist whose name is printed  above.    DD:  02/12/2024/DT:  02/12/2024                 CT CHEST WITH CONTRAST    COMPARISON: Correlation CT abdomen pelvis 2/12/2024    IMPRESSION:    Water-soluble contrast was administered which opacifies the distal esophagus.  There is mild wall thickening of the distal esophagus and GE junction but there is no evidence of leak.      The finding in question from the abdominal CT is a moderate right pleural effusion abutting the distal esophagus containing some foci of gas; this may be loculated.  At this time point since the surgery, this is favored to represent postoperative change but if there are signs of sepsis, this may indicate a developing infected pleural collection.  Small-volume pneumomediastinum anteriorly favored to be postoperative.  There is moderate lower lobe atelectasis bilaterally.  Small left simple appearing pleural effusion.    Normal heart size.  No pericardial effusion.    Case discussed with Dr James at 0409 EST.  If there are any questions please feel free to contact me directly at [phone], ext [phone]. If you cannot reach me at this number, do not leave a voicemail. Please call [phone] ext [phone] and ask for the next available radiologist.        Sukumar Gomes M.D.  This report has been electronically signed and verified by the Radiologist whose name is printed above.    DD:  02/12/2024/DT:  02/12/2024  [facility]                                         Medical Decision Making  Imaging reassuring.  Blood pressure downtrending.  She can monitor this at home As it is asymptomatic at this time.  No elevation in her white count or fever.  No indication for admission at this time.  she should continue her regular prescribed medications.  I will give her a course of mag citrate to take as well.  She should touch base with her surgeon tomorrow and come back before with worsening or change.  Tylenol safe, no ibuprofen.    Problems Addressed:  Constipation, unspecified constipation type:  acute illness or injury with systemic symptoms    Amount and/or Complexity of Data Reviewed  External Data Reviewed: notes.     Details: Admit date: 2/7/2024  Discharge date: 2/8/2024  Primary Care Physician: Erin Chao MD   Attending Physician: Anya Ramos MD   Consults:   Consultants          Provider   Role Specialty     Sukhjinder Palacios MD   Consulting Physician SURGERY, GENERAL     Cara Quiñones MD   Consulting Physician HOSPITALIST              Discharge Diagnoses:   Paraesophageal hernia     Reason for admission  Copied from admission H&P: please refer to preop H&P      Hospital Course:  Severe Gastroesophageal reflux disease. With paraesophageal hernia  - s/p laparoscopic paraesophageal hernia repair with biomesh, laparoscopic gastropexy and extensive HERBER.   - Pain control.   - Upper GI done and reviewed.   - diet advanced to soft diet and tolerating well.   - Pt up walking halls and passing gas.   - Ok to discharge home today         EXAM:   GENERAL: no apparent distress, comfortable  NEURO: A/A Ox3, no focal deficits  RESP: non labored, CTAB/L  CARDIO: Regular, no murmur  ABD: soft, NT, ND  EXTREMITIES: no edema, no calf tenderness     Operative Procedures: Procedure(s) (LRB):  Laparoscopic paraesophageal hernia repair with bioA mesh, laparoscopic gastropexy, extensive lysis of adhesions (N/A)     Discharge Instructions     Medication List          Labs: ordered. Decision-making details documented in ED Course.  Radiology: ordered and independent interpretation performed. Decision-making details documented in ED Course.     Details: By my review of the CT abdomen/pelvis there is no obvious evidence of bowel obstruction, free intraperitoneal air or significant free fluid.    Risk  OTC drugs.  Prescription drug management.  Decision regarding hospitalization.        Disposition and Plan     Clinical Impression:  1. Constipation, unspecified constipation type         Disposition:  Discharge  2/12/2024   3:22 am    Follow-up:  Erin Chao MD  [address] [place] [id number] [phone]    Call  As needed    Sukhjinder Palacios MD  [address]  [facility] [address] [phone]    Call today      We recommend that you schedule follow up care with a primary care provider within the next three months to obtain basic health screening including reassessment of your blood pressure.      Medications Prescribed:  Current Discharge Medication List        START taking these medications    Details   magnesium citrate 1.745 GM/30ML Oral Solution Take 148 mL by mouth once for 1 dose.  Qty: 148 mL, Refills: 0

## 2024-02-12 NOTE — TELEPHONE ENCOUNTER
Per pt she had scans done in the hospital that showed constipation, states she is good, but Dr. James wanted Dr. Palacios to look at the scan. Please advise

## 2024-02-12 NOTE — TELEPHONE ENCOUNTER
SPOKE TO PT, she is feeling better, eating soft foods, Cokes peaches and prunes, advised her to take stool softener, to assure  bowel movements every every couple days to avoid obstipation

## 2024-02-15 ENCOUNTER — PATIENT OUTREACH (OUTPATIENT)
Dept: CASE MANAGEMENT | Age: 74
End: 2024-02-15

## 2024-02-15 NOTE — PROGRESS NOTES
1st attempt ER f/up apt request  [facility] SURG -existing apt (2/27)  PCP -decline, pt doesn't want to schedule at this time  Closing encounter

## 2024-02-27 ENCOUNTER — OFFICE VISIT (OUTPATIENT)
Dept: SURGERY | Facility: CLINIC | Age: 74
End: 2024-02-27
Payer: MEDICARE

## 2024-02-27 VITALS — BODY MASS INDEX: 25 KG/M2 | WEIGHT: 134 LBS

## 2024-02-27 DIAGNOSIS — Z09 POSTOPERATIVE EXAMINATION: Primary | ICD-10-CM

## 2024-02-27 PROCEDURE — 99024 POSTOP FOLLOW-UP VISIT: CPT | Performed by: SURGERY

## 2024-02-27 NOTE — PROGRESS NOTES
Chief Complaint   Patient presents with    Post-Op     S/P paraesophageal hernia repair 2/7/2024.  Patient states she feels well, appetite is good, swallowing without difficulty, bowels are normal and regular.  Denies fevers.         O:  Wt 134 lb (60.8 kg)   BMI 24.51 kg/m²   GEN:  No acute distress  Abd:  Soft, NTND, incisions C/D/I      Assessment   1. Postoperative examination        Doing well sp Lap paraesophageal hernia repair with bioA mesh and gastropexy.  Diet as tolerated.  Continue to avoid heavy lifting for another month.  F/u 1 month.         Sukhjinder Palacios MD

## 2024-03-08 ENCOUNTER — OFFICE VISIT (OUTPATIENT)
Dept: INTERNAL MEDICINE CLINIC | Facility: CLINIC | Age: 74
End: 2024-03-08

## 2024-03-08 VITALS
BODY MASS INDEX: 24.11 KG/M2 | DIASTOLIC BLOOD PRESSURE: 80 MMHG | HEIGHT: 62 IN | HEART RATE: 92 BPM | WEIGHT: 131 LBS | RESPIRATION RATE: 18 BRPM | SYSTOLIC BLOOD PRESSURE: 166 MMHG

## 2024-03-08 DIAGNOSIS — K21.9 GASTROESOPHAGEAL REFLUX DISEASE WITHOUT ESOPHAGITIS: ICD-10-CM

## 2024-03-08 DIAGNOSIS — Z87.19 HISTORY OF REPAIR OF HIATAL HERNIA: Primary | ICD-10-CM

## 2024-03-08 DIAGNOSIS — Z98.890 HISTORY OF REPAIR OF HIATAL HERNIA: Primary | ICD-10-CM

## 2024-03-08 PROCEDURE — 99213 OFFICE O/P EST LOW 20 MIN: CPT | Performed by: INTERNAL MEDICINE

## 2024-03-08 NOTE — PROGRESS NOTES
Subjective:     Patient ID: Vijaya Shipley is a 73 year old female.  Presents for follow-up after surgery    HPI  Patient underwent hiatal hernia repair 4 weeks ago at [facility], after surgery had to go to emergency room was very anxious blood pressure was high, she had difficulty swallowing.  CT scan of the chest and abdomen and pelvis did not show acute abnormality.  She is following direction eating small meals some of the food may cause nausea she may take couple times, she is taking famotidine 40 mg every day.  May experience mild left upper quadrant discomfort after meal, states that it is expected according to advice from surgeon .  She reports no constipations.  Patient has multiple abdominal adhesions and colonoscopy was not performed last time because of the danger of perforation and she was advised never do colonoscopy again.    Current Outpatient Medications   Medication Sig Dispense Refill    famotidine 40 MG Oral Tab TAKE 1 TABLET BY MOUTH EVERY DAY (Patient taking differently: every evening. TAKE 1 TABLET BY MOUTH EVERY DAY) 90 tablet 3    LEVOCETIRIZINE 5 MG Oral Tab TAKE 1 TABLET BY MOUTH EVERY DAY IN THE EVENING 90 tablet 1    calcium carbonate 500 MG Oral Chew Tab Chew 1 tablet (500 mg total) by mouth daily. (Patient not taking: Reported on 3/8/2024)       Allergies:  Allergies   Allergen Reactions    Seasonal        Past Medical History:   Diagnosis Date    Allergic rhinitis     Cataract     Esophageal reflux 2017    Hiatal hernia     Ovarian cancer (HCC) 2003    s/p surgery alone (total abdominal hysterectomy with bilateral salpingo oopherectomy)    Visual impairment     readers      Past Surgical History:   Procedure Laterality Date    COLONOSCOPY  2017    EGD  2017    HERNIA SURGERY      HYSTERECTOMY      OTHER      removal of the ovaries    OTHER SURGICAL HISTORY  2014    septoplasty , dr contreras     OTHER SURGICAL HISTORY      deviated septum -- rhinoplasty    TOTAL ABDOM  HYSTERECTOMY        Family History   Problem Relation Age of Onset    Cancer Mother 57        cervical    Cancer Father 72        lung cancer;tobacco use    Cancer Sister 79        CLL    Crohn's Disease Daughter     Bleeding Disorders Neg     DVT/VTE Neg       Social History:   Social History     Socioeconomic History    Marital status:    Tobacco Use    Smoking status: Never     Passive exposure: Never    Smokeless tobacco: Never   Vaping Use    Vaping Use: Never used   Substance and Sexual Activity    Alcohol use: No    Drug use: No   Other Topics Concern    Breast feeding No    Reaction to local anesthetic No    Pt has a pacemaker No    Pt has a defibrillator No   Social History Narrative    Vijaya is  to Jamey. She has 3 adult children. Patient is retired from being a . She lives with her  in [place], [place].     Social Determinants of Health     Food Insecurity: No Food Insecurity (2/7/2024)    Food Insecurity     Food Insecurity: Never true   Transportation Needs: No Transportation Needs (2/7/2024)    Transportation Needs     Lack of Transportation: No   Housing Stability: Low Risk  (2/7/2024)    Housing Stability     Housing Instability: No        BP (!) 166/80 (BP Location: Left arm, Patient Position: Sitting, Cuff Size: adult)   Pulse 92   Resp 18   Ht 5' 2\" (1.575 m)   Wt 131 lb (59.4 kg)   BMI 23.96 kg/m²    Physical Exam  Constitutional:       Appearance: Normal appearance.   HENT:      Head: Normocephalic and atraumatic.   Eyes:      General: No scleral icterus.     Extraocular Movements: Extraocular movements intact.      Conjunctiva/sclera: Conjunctivae normal.      Pupils: Pupils are equal, round, and reactive to light.   Cardiovascular:      Rate and Rhythm: Normal rate and regular rhythm.      Heart sounds: No murmur heard.     No gallop.   Pulmonary:      Effort: Pulmonary effort is normal.      Breath sounds: Normal breath sounds. No wheezing or rhonchi.    Abdominal:      General: Bowel sounds are normal.      Palpations: Abdomen is soft. There is no mass.      Tenderness: There is no abdominal tenderness. There is no guarding or rebound.   Musculoskeletal:         General: Normal range of motion.      Cervical back: Normal range of motion and neck supple.      Right lower leg: No edema.      Left lower leg: No edema.   Skin:     General: Skin is warm.      Coloration: Skin is not jaundiced.      Comments: Healing incisions no evidence of   Neurological:      General: No focal deficit present.      Mental Status: She is alert and oriented to person, place, and time. Mental status is at baseline.   Psychiatric:         Mood and Affect: Mood normal.         Behavior: Behavior normal.         Thought Content: Thought content normal.         Assessment & Plan:       ICD-10-CM    1. History of repair of hiatal hernia doing well clinically, Z98.890     Z87.19       2. Gastroesophageal reflux disease without esophagitis continue small meals soft food, will start patient on pantoprazole 40 mg daily for 1 month and as needed after that K21.9    3.       Elevated blood pressure without diagnosis of hypertension, patient will monitor blood pressure at home and return if stays elevated will consider medications        Meds This Visit:  Requested Prescriptions      No prescriptions requested or ordered in this encounter       Imaging & Referrals:  None

## 2024-03-11 ENCOUNTER — TELEPHONE (OUTPATIENT)
Dept: INTERNAL MEDICINE CLINIC | Facility: CLINIC | Age: 74
End: 2024-03-11

## 2024-03-11 NOTE — TELEPHONE ENCOUNTER
Patient called (identified name and ),   States had surgery for  hiatal hernia.  Had spoken to Dr Chao at appointment 3/08/224 about taking an acid reducing medication for 90 days in place of famotidine, but nothing was sent to the pharmacy.  Dr Chao,please advise on script?  [facility] in [place].  Plan for office note not shown.

## 2024-03-14 PROBLEM — Z09 POSTOPERATIVE EXAMINATION: Status: RESOLVED | Noted: 2024-02-27 | Resolved: 2024-03-14

## 2024-03-14 PROBLEM — Z87.19 STATUS POST REPAIR OF PARAESOPHAGEAL DIAPHRAGMATIC HERNIA: Status: ACTIVE | Noted: 2024-03-14

## 2024-03-14 PROBLEM — Z98.890 STATUS POST REPAIR OF PARAESOPHAGEAL DIAPHRAGMATIC HERNIA: Status: ACTIVE | Noted: 2024-03-14

## 2024-03-14 PROBLEM — K44.9 PARAESOPHAGEAL HERNIA: Status: RESOLVED | Noted: 2023-06-13 | Resolved: 2024-03-14

## 2024-03-14 PROBLEM — R03.0 ELEVATED BLOOD-PRESSURE READING WITHOUT DIAGNOSIS OF HYPERTENSION: Status: ACTIVE | Noted: 2024-03-14

## 2024-03-19 ENCOUNTER — OFFICE VISIT (OUTPATIENT)
Dept: SURGERY | Facility: CLINIC | Age: 74
End: 2024-03-19
Payer: MEDICARE

## 2024-03-19 VITALS — WEIGHT: 131 LBS | BODY MASS INDEX: 24 KG/M2

## 2024-03-19 DIAGNOSIS — Z09 POSTOPERATIVE EXAMINATION: Primary | ICD-10-CM

## 2024-03-19 PROCEDURE — 99024 POSTOP FOLLOW-UP VISIT: CPT | Performed by: SURGERY

## 2024-03-19 NOTE — PROGRESS NOTES
Chief Complaint   Patient presents with    Post-Op     S/P Paraesophageal hernia repair 2/7.  Patient states she feels well, bowels are normal and regular, appetite is improving.  Denies fevers, pain.         O:  Wt 131 lb (59.4 kg)   BMI 23.96 kg/m²   GEN:  No acute distress  Abd:  Soft, NTND, incisions C/D/I    Path:  Reviewed w pt    Assessment   1. Postoperative examination        Doing well sp Lap paraesophageal hernia repair and gastropexy.  F/u prn.         Sukhjinder Palacios MD

## 2024-03-26 DIAGNOSIS — D72.820 LYMPHOCYTOSIS: Primary | ICD-10-CM

## 2024-03-27 ENCOUNTER — TELEPHONE (OUTPATIENT)
Dept: HEMATOLOGY/ONCOLOGY | Facility: HOSPITAL | Age: 74
End: 2024-03-27

## 2024-03-27 NOTE — TELEPHONE ENCOUNTER
Called and spoke with patient. Reminded her there are labs ordered by Dr. Schulz that he would like you to complete before your appointment with him on Thursday, 3/28/2024 at 10:00 am. These labs can be done at any [place] lab location. Asked her if she's able to go to the lab today and she stated no. Asked her if she could come in at 9:00 am tomorrow, so we can draw her labs here at our lab. She stated yes she can come in at 9:00 am. Lab appointment scheduled at 9:00 am before her follow up visit. Patient stated understanding and thanked me for the call.

## 2024-03-28 ENCOUNTER — NURSE ONLY (OUTPATIENT)
Dept: HEMATOLOGY/ONCOLOGY | Facility: HOSPITAL | Age: 74
End: 2024-03-28
Attending: INTERNAL MEDICINE
Payer: MEDICARE

## 2024-03-28 VITALS
DIASTOLIC BLOOD PRESSURE: 78 MMHG | HEART RATE: 99 BPM | TEMPERATURE: 99 F | RESPIRATION RATE: 16 BRPM | HEIGHT: 62 IN | OXYGEN SATURATION: 98 % | SYSTOLIC BLOOD PRESSURE: 179 MMHG | WEIGHT: 131.63 LBS | BODY MASS INDEX: 24.22 KG/M2

## 2024-03-28 DIAGNOSIS — Z98.890 HISTORY OF HERNIA REPAIR: ICD-10-CM

## 2024-03-28 DIAGNOSIS — Z87.19 HISTORY OF HERNIA REPAIR: ICD-10-CM

## 2024-03-28 DIAGNOSIS — D72.820 MONOCLONAL B-CELL LYMPHOCYTOSIS: Primary | ICD-10-CM

## 2024-03-28 DIAGNOSIS — R63.4 WEIGHT LOSS: ICD-10-CM

## 2024-03-28 DIAGNOSIS — D72.820 LYMPHOCYTOSIS: ICD-10-CM

## 2024-03-28 LAB
ALBUMIN SERPL-MCNC: 4.4 G/DL (ref 3.2–4.8)
ALBUMIN/GLOB SERPL: 1.8 {RATIO} (ref 1–2)
ALP LIVER SERPL-CCNC: 80 U/L
ALT SERPL-CCNC: 15 U/L
ANION GAP SERPL CALC-SCNC: 3 MMOL/L (ref 0–18)
AST SERPL-CCNC: 20 U/L (ref ?–34)
BASOPHILS # BLD AUTO: 0.08 X10(3) UL (ref 0–0.2)
BASOPHILS NFR BLD AUTO: 0.9 %
BILIRUB SERPL-MCNC: 0.5 MG/DL (ref 0.2–1.1)
BUN BLD-MCNC: 13 MG/DL (ref 9–23)
BUN/CREAT SERPL: 15.5 (ref 10–20)
CALCIUM BLD-MCNC: 9.6 MG/DL (ref 8.7–10.4)
CHLORIDE SERPL-SCNC: 109 MMOL/L (ref 98–112)
CO2 SERPL-SCNC: 30 MMOL/L (ref 21–32)
CREAT BLD-MCNC: 0.84 MG/DL
DEPRECATED RDW RBC AUTO: 42.8 FL (ref 35.1–46.3)
EGFRCR SERPLBLD CKD-EPI 2021: 72 ML/MIN/1.73M2 (ref 60–?)
EOSINOPHIL # BLD AUTO: 0.21 X10(3) UL (ref 0–0.7)
EOSINOPHIL NFR BLD AUTO: 2.3 %
ERYTHROCYTE [DISTWIDTH] IN BLOOD BY AUTOMATED COUNT: 13.1 % (ref 11–15)
GLOBULIN PLAS-MCNC: 2.4 G/DL (ref 2.8–4.4)
GLUCOSE BLD-MCNC: 95 MG/DL (ref 70–99)
HCT VFR BLD AUTO: 40.7 %
HGB BLD-MCNC: 14.3 G/DL
IMM GRANULOCYTES # BLD AUTO: 0.01 X10(3) UL (ref 0–1)
IMM GRANULOCYTES NFR BLD: 0.1 %
LYMPHOCYTES # BLD AUTO: 5.11 X10(3) UL (ref 1–4)
LYMPHOCYTES NFR BLD AUTO: 57.2 %
MCH RBC QN AUTO: 31.5 PG (ref 26–34)
MCHC RBC AUTO-ENTMCNC: 35.1 G/DL (ref 31–37)
MCV RBC AUTO: 89.6 FL
MONOCYTES # BLD AUTO: 0.43 X10(3) UL (ref 0.1–1)
MONOCYTES NFR BLD AUTO: 4.8 %
NEUTROPHILS # BLD AUTO: 3.1 X10 (3) UL (ref 1.5–7.7)
NEUTROPHILS # BLD AUTO: 3.1 X10(3) UL (ref 1.5–7.7)
NEUTROPHILS NFR BLD AUTO: 34.7 %
OSMOLALITY SERPL CALC.SUM OF ELEC: 294 MOSM/KG (ref 275–295)
PLATELET # BLD AUTO: 180 10(3)UL (ref 150–450)
POTASSIUM SERPL-SCNC: 4.4 MMOL/L (ref 3.5–5.1)
PROT SERPL-MCNC: 6.8 G/DL (ref 5.7–8.2)
RBC # BLD AUTO: 4.54 X10(6)UL
SODIUM SERPL-SCNC: 142 MMOL/L (ref 136–145)
WBC # BLD AUTO: 8.9 X10(3) UL (ref 4–11)

## 2024-03-28 PROCEDURE — 99214 OFFICE O/P EST MOD 30 MIN: CPT | Performed by: INTERNAL MEDICINE

## 2024-03-28 PROCEDURE — 80053 COMPREHEN METABOLIC PANEL: CPT

## 2024-03-28 PROCEDURE — 36415 COLL VENOUS BLD VENIPUNCTURE: CPT

## 2024-03-28 PROCEDURE — 85025 COMPLETE CBC W/AUTO DIFF WBC: CPT

## 2024-03-28 NOTE — PROGRESS NOTES
Cancer Center Progress Note    Patient Name: Vijaya Shipley   YOB: 1950   Medical Record Number: [id number]   CSN: [id number]   Consulting Physician: Dax Schulz MD  Referring Physician(s): No ref. provider found  Date of Visit:  3/28/2024     Chief Complaint:  Monoclonal B- Cell Lymphocytosis    History of Present Illness:     Vijaya Shipley is a 74 year old female that was seen today in the Cancer Center for evaluation of the above condition. Patient gets some GERD symptoms due to the hiatal hernia with plans for surgery by Dr. Palacios in place. Patient has PMH relevant for ovarian cancer status post total abdominal hysterectomy with bilateral salpingo-oophorectomy in 2003 as well as history of allergic rhinitis.  She mentions her sister has CLL and the patient is quite concerned she has the same condition based on increased absolute lymphocytosis from September with a value with 5500 to current results with a value of 6070.  Remainder of her differential is within normal limits as well as WBC, hemoglobin/hematocrit and platelets.  Vijaya reports being in her usual state of health over the last 6 to 12 months. Patient denies systemic signs of illness, regular bleeding, concerns for chest pain, dyspnea, nausea, vomiting or abdominal pain.    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  She recently underwent surgery for hernia repair in early February 2024 with Dr. Palacios.  Patient mentions she was unable to eat following surgery leading to some mild weight loss.  Otherwise, denies systemic signs of illness.  No reports of bleeding, fever, infection.  Patient has no concerns for chest pain, dyspnea, nausea, vomiting, abdominal pain, or other concerns on ROS.    Past Medical History:  Past Medical History:   Diagnosis Date    Allergic rhinitis     Cataract     Esophageal reflux 2017    Hiatal hernia     Ovarian cancer (HCC) 2003    s/p surgery alone (total  abdominal hysterectomy with bilateral salpingo oopherectomy)    Visual impairment     readers       Past Surgical History:  Past Surgical History:   Procedure Laterality Date    COLONOSCOPY  2017    EGD  2017    HERNIA SURGERY      HYSTERECTOMY      OTHER      removal of the ovaries    OTHER SURGICAL HISTORY  2014    septoplasty , dr contreras     OTHER SURGICAL HISTORY      deviated septum -- rhinoplasty    TOTAL ABDOM HYSTERECTOMY         Family Medical History:  Family History   Problem Relation Age of Onset    Cancer Mother 57        cervical    Cancer Father 72        lung cancer;tobacco use    Cancer Sister 79        CLL    Crohn's Disease Daughter     Bleeding Disorders Neg     DVT/VTE Neg        Gyne History:  OB History   No obstetric history on file.       Social History:  Social History     Socioeconomic History    Marital status:      Spouse name: Not on file    Number of children: Not on file    Years of education: Not on file    Highest education level: Not on file   Occupational History    Not on file   Tobacco Use    Smoking status: Never     Passive exposure: Never    Smokeless tobacco: Never   Vaping Use    Vaping Use: Never used   Substance and Sexual Activity    Alcohol use: No    Drug use: No    Sexual activity: Not on file   Other Topics Concern    Grew up on a farm Not Asked    History of tanning Not Asked    Outdoor occupation Not Asked    Breast feeding No    Reaction to local anesthetic No    Pt has a pacemaker No    Pt has a defibrillator No   Social History Narrative    Vijaya is  to Jamey. She has 3 adult children. Patient is retired from being a . She lives with her  in [place], [place].     Social Determinants of Health     Financial Resource Strain: Not on file   Food Insecurity: No Food Insecurity (2/7/2024)    Food Insecurity     Food Insecurity: Never true   Transportation Needs: No Transportation Needs (2/7/2024)    Transportation Needs     Lack of  Transportation: No   Physical Activity: Not on file   Stress: Not on file   Social Connections: Not on file   Housing Stability: Low Risk  (2/7/2024)    Housing Stability     Housing Instability: No     Housing Instability Emergency: Not on file       Allergies:   Allergies   Allergen Reactions    Seasonal        Current Medications:   pantoprazole 40 MG Oral Tab EC Take 1 tablet (40 mg total) by mouth every morning before breakfast. 90 tablet 0    calcium carbonate 500 MG Oral Chew Tab Chew 1 tablet (500 mg total) by mouth daily.      LEVOCETIRIZINE 5 MG Oral Tab TAKE 1 TABLET BY MOUTH EVERY DAY IN THE EVENING 90 tablet 1       Review of Systems:    Constitutional: Negative for anorexia, chills, fatigue, fevers, night sweats and +weight loss after surgery.  Eyes: Negative for visual disturbance, irritation and redness.  Respiratory: Negative for cough, hemoptysis, chest pain, or dyspnea on exertion.  Gastrointestinal: Negative for dysphagia, odynophagia, reflux symptoms, nausea, vomiting, change in bowel habits, diarrhea, constipation and abdominal pain.  Integument/breast: Negative for rash, skin lesions, and pruritus.  Hematologic/lymphatic: Negative for easy bruising, bleeding, and lymphadenopathy.  Musculoskeletal: Negative for myalgias, arthralgias, muscle weakness.  Neurological: Negative for headaches, dizziness, speech problems, gait problems and weakness.    A comprehensive 14 point review of systems was completed.  Pertinent positives and negatives noted in the the HPI.     Vital Signs:  BP (!) 179/78 (BP Location: Left arm, Patient Position: Sitting, Cuff Size: adult)   Pulse 99   Temp 98.6 °F (37 °C) (Oral)   Resp 16   Ht 1.575 m (5' 2\")   Wt 59.7 kg (131 lb 9.6 oz)   SpO2 98%   BMI 24.07 kg/m²     Physical Examination:    General: Patient is alert and oriented x 3, not in acute distress.  Psych: Friendly, cooperative with appropriate questions and responses  HEENT: EOMs intact. Oropharynx is  clear.   Neck:  No palpable lymphadenopathy. Neck is supple.  Lymphatics: There is no palpable lymphadenopathy throughout in the cervical, supraclavicular, axillary, or inguinal regions.  Chest: Clear to auscultation. No wheezes or rales.  Heart: Regular rate and rhythm. S1S2 normal.  Abdomen: Soft, non tender with good bowel sounds.  No hepatosplenomegaly.  No palpable mass.  Extremities: No edema or calf tenderness.  Neurological: Grossly intact.     Performance Status:    ECOG 0: Fully active, able to carry on all pre-disease performance without restriction      Labs:    Lab Results   Component Value Date/Time    WBC 8.9 03/28/2024 08:35 AM    RBC 4.54 03/28/2024 08:35 AM    HGB 14.3 03/28/2024 08:35 AM    HCT 40.7 03/28/2024 08:35 AM    MCV 89.6 03/28/2024 08:35 AM    MCH 31.5 03/28/2024 08:35 AM    MCHC 35.1 03/28/2024 08:35 AM    RDW 13.1 03/28/2024 08:35 AM    NEPRELIM 3.10 03/28/2024 08:35 AM    .0 03/28/2024 08:35 AM       Lab Results   Component Value Date/Time    GLU 95 03/28/2024 08:35 AM    BUN 13 03/28/2024 08:35 AM    CREATSERUM 0.84 03/28/2024 08:35 AM    GFRNAA 58 (L) 03/16/2022 02:06 PM    CA 9.6 03/28/2024 08:35 AM    ALB 4.4 03/28/2024 08:35 AM     03/28/2024 08:35 AM    K 4.4 03/28/2024 08:35 AM     03/28/2024 08:35 AM    CO2 30.0 03/28/2024 08:35 AM    ALKPHO 80 03/28/2024 08:35 AM    AST 20 03/28/2024 08:35 AM    ALT 15 03/28/2024 08:35 AM       Imaging:    N/A    Pathology:    Flow cytometry from 12/23 reviewed w/ pt consistent with monoclonal B-Cell Lymphocytosis    Impression:      ICD-10-CM    1. Monoclonal B-cell lymphocytosis  D72.820 CBC With Differential With Platelet     Comp Metabolic Panel (14)     LDH     LEUKEMIA LYMPHOMA FLOW BLOOD [E]      2. History of hernia repair  Z98.890     Z87.19       3. Weight loss  R63.4           74 year old W with PMH relevant for ovarian cancer status post total abdominal hysterectomy with bilateral salpingo-oophorectomy in 2003  as well as history of allergic rhinitis presents for an evaluation of Monoclonal B- Cell Lymphocytosis    Plan:    1.) Monoclonal B-Cell Lymphocytosis    --reviewed w/ pt after consultation in 12/23 that her results are consistent with monoclonal B-Cell Lymphocytosis by flow cytometry  --does not have chronic hepatitis or  HIV  --no signs of end organ damage noted at consultation  --f/u today w/o systemic signs of illness, no signs fo anemia or thrombocytopenia. Her absolute lymphocyte count is lower than at the time of diagnosis, so doubtful that she has progress into CLL at this time  --f/u in 6 mo w/ repeat eval and lab testing  --if she meets criteria for CLL, would not need tx until she developed systemic signs of illness, anemia or thrombocytopenia    2.) Hx of hernia repair     --reviewed pathology from 2/7; does not show signs of malignancy   --pt's mild weight loss is due to her recent surgery; will continue to monitor    MDM: Moderate     Dax Schulz MD  [facility]  [facility] [address] [address]

## 2024-06-10 RX ORDER — PANTOPRAZOLE SODIUM 40 MG/1
40 TABLET, DELAYED RELEASE ORAL
Qty: 90 TABLET | Refills: 3 | Status: SHIPPED | OUTPATIENT
Start: 2024-06-10

## 2024-06-10 NOTE — TELEPHONE ENCOUNTER
Refill Per Protocol   Please review pended refill request as unable to refill due to high/very high interaction warning copied here:  Duplicate Therapy: famotidine, pantoprazolePEPTIC ULCER AGENTS. No Abuse/Dependency Potential.  Details  Override reason        Requested Prescriptions   Pending Prescriptions Disp Refills    PANTOPRAZOLE 40 MG Oral Tab EC [Pharmacy Med Name: PANTOPRAZOLE SOD DR 40 MG TAB] 90 tablet 0     Sig: TAKE 1 TABLET BY MOUTH EVERY DAY IN THE MORNING BEFORE BREAKFAST       Gastrointestional Medication Protocol Passed - 6/6/2024 12:23 AM        Passed - In person appointment or virtual visit in the past 12 mos or appointment in next 3 mos     Recent Outpatient Visits              2 months ago Lymphocytosis    [facility] Hematology Oncology    Nurse Only    2 months ago Monoclonal B-cell lymphocytosis    [facility]ematology Oncology Dax Schulz MD    Office Visit    2 months ago Postoperative examination    [facility] Sukhjinder Kendall MD    Office Visit    3 months ago History of repair of hiatal hernia    [facility], [address], [place] Erin Chao MD    Office Visit    3 months ago Postoperative examination    [facility], Sukhjinder Kendall MD    Office Visit          Future Appointments         Provider Department Appt Notes    Tomorrow Erin Chao MD [facility], [address], [place] medicare px - last px 4/26/23    In 3 months [facility] Hematology Oncology [email or web address]    In 3 months Dax Schulz MD [facility] Hematology Oncology FOLLOW UP [email or web address].CL  6M                           Future Appointments         Provider Department Appt Notes    Tomorrow Erin Chao MD [facility], [address], [place] medicare px - last px 4/26/23    In 3 months [facility] Hematology Oncology  [email or web address]    In 3 months Dax Schulz MD [facility] Hematology Oncology FOLLOW UP [email or web address].CL  6M          Recent Outpatient Visits              2 months ago Lymphocytosis    [facility] Hematology Oncology    Nurse Only    2 months ago Monoclonal B-cell lymphocytosis    [facility]ematology Oncology Dax Schulz MD    Office Visit    2 months ago Postoperative examination    [facility], Sukhjinder Kendall MD    Office Visit    3 months ago History of repair of hiatal hernia    [facility], [place] Kandel, Ninel, MD    Office Visit    3 months ago Postoperative examination    [facility], Sukhjinder Kendall MD    Office Visit

## 2024-06-11 ENCOUNTER — LAB ENCOUNTER (OUTPATIENT)
Dept: LAB | Age: 74
End: 2024-06-11
Attending: INTERNAL MEDICINE
Payer: MEDICARE

## 2024-06-11 ENCOUNTER — OFFICE VISIT (OUTPATIENT)
Dept: INTERNAL MEDICINE CLINIC | Facility: CLINIC | Age: 74
End: 2024-06-11
Payer: MEDICARE

## 2024-06-11 VITALS
BODY MASS INDEX: 25 KG/M2 | HEART RATE: 116 BPM | SYSTOLIC BLOOD PRESSURE: 176 MMHG | RESPIRATION RATE: 18 BRPM | WEIGHT: 134 LBS | DIASTOLIC BLOOD PRESSURE: 92 MMHG

## 2024-06-11 DIAGNOSIS — E78.49 OTHER HYPERLIPIDEMIA: ICD-10-CM

## 2024-06-11 DIAGNOSIS — Z00.00 MEDICARE ANNUAL WELLNESS VISIT, SUBSEQUENT: Primary | ICD-10-CM

## 2024-06-11 DIAGNOSIS — Z87.19 HISTORY OF REPAIR OF HIATAL HERNIA: ICD-10-CM

## 2024-06-11 DIAGNOSIS — Z98.890 HISTORY OF REPAIR OF HIATAL HERNIA: ICD-10-CM

## 2024-06-11 DIAGNOSIS — K21.9 GASTROESOPHAGEAL REFLUX DISEASE WITHOUT ESOPHAGITIS: ICD-10-CM

## 2024-06-11 DIAGNOSIS — R03.0 WHITE COAT SYNDROME WITHOUT DIAGNOSIS OF HYPERTENSION: ICD-10-CM

## 2024-06-11 LAB
CHOLEST SERPL-MCNC: 212 MG/DL (ref ?–200)
FASTING PATIENT LIPID ANSWER: YES
HDLC SERPL-MCNC: 50 MG/DL (ref 40–59)
LDLC SERPL CALC-MCNC: 132 MG/DL (ref ?–100)
NONHDLC SERPL-MCNC: 162 MG/DL (ref ?–130)
TRIGL SERPL-MCNC: 167 MG/DL (ref 30–149)
VLDLC SERPL CALC-MCNC: 30 MG/DL (ref 0–30)

## 2024-06-11 PROCEDURE — G0009 ADMIN PNEUMOCOCCAL VACCINE: HCPCS | Performed by: INTERNAL MEDICINE

## 2024-06-11 PROCEDURE — 36415 COLL VENOUS BLD VENIPUNCTURE: CPT

## 2024-06-11 PROCEDURE — 90677 PCV20 VACCINE IM: CPT | Performed by: INTERNAL MEDICINE

## 2024-06-11 PROCEDURE — 80061 LIPID PANEL: CPT

## 2024-06-11 PROCEDURE — G0439 PPPS, SUBSEQ VISIT: HCPCS | Performed by: INTERNAL MEDICINE

## 2024-06-11 RX ORDER — METOPROLOL SUCCINATE 25 MG/1
TABLET, EXTENDED RELEASE ORAL
Qty: 45 TABLET | Refills: 0 | Status: SHIPPED | OUTPATIENT
Start: 2024-06-11

## 2024-06-11 NOTE — PROGRESS NOTES
Subjective:   Vijaya Shipley is a 74 year old female who presents for a Medicare Subsequent Annual Wellness visit (Pt already had Initial Annual Wellness) and scheduled follow up of multiple significant but stable problems.     Patient states that she has been doing very well, eating healthy, does state that she eats a lot of nuts trying to bring cholesterol down, mentions that she drinks up to 6 cups of tea preferably green tea did not realize that he has a lot of caffeine.  She came today with a blood pressure diary it is running up to 140/90 with pulse between 69 and 84.  She is concerned by taking famotidine and pantoprazole about side effects, but states that bothered by heartburns even in spite of taking medications at times.  States that her father had same problem.  She is not interested in getting DEXA scan    History/Other:   Fall Risk Assessment:   She has been screened for Falls and is low risk.      Cognitive Assessment:   She had a completely normal cognitive assessment - see flowsheet entries     Functional Ability/Status:   Vijaya Shipley has some abnormal functions as listed below:  She has Vision problems based on screening of functional status.       Depression Screening (PHQ-2/PHQ-9): PHQ-2 SCORE: 0  , done 6/11/2024       Advanced Directives:   She does NOT have a Living Will. [ ]  She has a Power of  for Health Care on file in [facility].        Patient Active Problem List   Diagnosis    Gastroesophageal reflux disease without esophagitis    Non-seasonal allergic rhinitis due to pollen    Status post repair of paraesophageal diaphragmatic hernia    Elevated blood-pressure reading without diagnosis of hypertension    Postoperative examination     Allergies:  She is allergic to seasonal.    Current Medications:  Outpatient Medications Marked as Taking for the 6/11/24 encounter (Office Visit) with Erin Chao MD   Medication Sig    metoprolol succinate ER 25 MG Oral Tablet 24 Hr Take   1/2 tab po daily    PANTOPRAZOLE 40 MG Oral Tab EC TAKE 1 TABLET BY MOUTH EVERY DAY IN THE MORNING BEFORE BREAKFAST    LEVOCETIRIZINE 5 MG Oral Tab TAKE 1 TABLET BY MOUTH EVERY DAY IN THE EVENING       Medical History:  She  has a past medical history of Allergic rhinitis, Cataract, Esophageal reflux (2017), Hiatal hernia, Ovarian cancer (HCC) (2003), and Visual impairment.  Surgical History:  She  has a past surgical history that includes hernia surgery; colonoscopy (2017); egd (2017); total abdom hysterectomy; other; hysterectomy; other surgical history (2014); and other surgical history.   Family History:  Her family history includes Cancer (age of onset: 57) in her mother; Cancer (age of onset: 72) in her father; Cancer (age of onset: 79) in her sister; Crohn's Disease in her daughter.  Social History:  She  reports that she has never smoked. She has never been exposed to tobacco smoke. She has never used smokeless tobacco. She reports that she does not drink alcohol and does not use drugs.    Tobacco:  She has never smoked tobacco.    CAGE Alcohol Screen:   CAGE screening score of 0 on 6/11/2024, showing low risk of alcohol abuse.      Patient Care Team:  Erin Chao MD as PCP - General (Internal Medicine)  Sukhjinder Palacios MD (SURGERY, GENERAL)      Physical Exam  Constitutional:       Appearance: Normal appearance.   HENT:      Right Ear: Tympanic membrane and ear canal normal. There is no impacted cerumen.      Left Ear: Tympanic membrane and ear canal normal. There is no impacted cerumen.   Eyes:      General: No scleral icterus.     Extraocular Movements: Extraocular movements intact.      Conjunctiva/sclera: Conjunctivae normal.      Pupils: Pupils are equal, round, and reactive to light.   Cardiovascular:      Rate and Rhythm: Normal rate and regular rhythm.      Heart sounds: No murmur heard.     No gallop.   Pulmonary:      Effort: Pulmonary effort is normal. No respiratory distress.      Breath sounds:  No wheezing.   Abdominal:      General: Bowel sounds are normal.      Palpations: Abdomen is soft. There is no mass.      Tenderness: There is no abdominal tenderness.      Hernia: No hernia is present.   Musculoskeletal:         General: Normal range of motion.      Cervical back: Normal range of motion and neck supple.      Right lower leg: No edema.      Left lower leg: No edema.   Lymphadenopathy:      Cervical: No cervical adenopathy.   Skin:     General: Skin is warm.      Coloration: Skin is not jaundiced.   Neurological:      General: No focal deficit present.      Mental Status: She is alert and oriented to person, place, and time. Mental status is at baseline.   Psychiatric:         Mood and Affect: Mood normal.         Behavior: Behavior normal.         Thought Content: Thought content normal.           BP (!) 176/92 (BP Location: Left arm, Patient Position: Sitting, Cuff Size: adult)   Pulse 116   Resp 18   Wt 134 lb (60.8 kg)   BMI 24.51 kg/m²  Estimated body mass index is 24.51 kg/m² as calculated from the following:    Height as of 3/28/24: 5' 2\" (1.575 m).    Weight as of this encounter: 134 lb (60.8 kg).    Medicare Hearing Assessment:   Hearing Screening    Time taken: 6/11/2024 10:27 AM  Entry User: Iona Avila MA  Screening Method: Finger Rub  Finger Rub Result: Pass         Visual Acuity:   Right Eye Visual Acuity: Corrected     Left Eye Visual Acuity: Corrected     Both Eyes Visual Acuity: Corrected Both Eyes Chart Acuity: 20/40   Able To Tolerate Visual Acuity: Yes        Assessment & Plan:   Vijaya Shipley is a 74 year old female who presents for a Medicare Assessment.     1. Medicare annual wellness visit, subsequent (Primary)  2. Other hyperlipidemia stable asymptomatic, patient will continue low-fat low-cholesterol diet will check lipids in couple months  -     Lipid Panel; Future; Expected date: 06/11/2024  3. Gastroesophageal reflux disease without esophagitis stable  encourage to follow GERD diet eliminate caffeine is much as possible may need less medication  4. History of repair of hiatal hernia stable no treatment needed  5. White coat syndrome without diagnosis of hypertension patient will monitor blood pressure at home and report in 2 weeks after starting metoprolol ER 12.25 mg daily  Other orders  -     Metoprolol Succinate ER; Take  1/2 tab po daily  Dispense: 45 tablet; Refill: 0  -     Prevnar 20 (PCV20) [82902]    The patient indicates understanding of these issues and agrees to the plan.    Follow-up in 3 months for blood pressure check    Erin Chao MD, 6/11/2024     Supplementary Documentation:   General Health:  In the past six months, have you lost more than 10 pounds without trying?: 2 - No  Has your appetite been poor?: No  Type of Diet: Balanced  How does the patient maintain a good energy level?: Daily Walks  How would you describe your daily physical activity?: Moderate  How would you describe your current health state?: Good  How do you maintain positive mental well-being?: Social Interaction;Visiting Family  On a scale of 0 to 10, with 0 being no pain and 10 being severe pain, what is your pain level?: 0 - (None)  In the past six months, have you experienced urine leakage?: 0-No  At any time do you feel concerned for the safety/well-being of yourself and/or your children, in your home or elsewhere?: No  Have you had any immunizations at another office such as Influenza, Hepatitis B, Tetanus, or Pneumococcal?: No       Vijaya Shipley's SCREENING SCHEDULE   Tests on this list are recommended by your physician but may not be covered, or covered at this frequency, by your insurer.   Please check with your insurance carrier before scheduling to verify coverage.   PREVENTATIVE SERVICES FREQUENCY &  COVERAGE DETAILS LAST COMPLETION DATE   Diabetes Screening    Fasting Blood Sugar /  Glucose    One screening every 12 months if never tested or if previously  tested but not diagnosed with pre-diabetes   One screening every 6 months if diagnosed with pre-diabetes Lab Results   Component Value Date    GLU 95 03/28/2024        Cardiovascular Disease Screening    Lipid Panel  Cholesterol  Lipoprotein (HDL)  Triglycerides Covered every 5 years for all Medicare beneficiaries without apparent signs or symptoms of cardiovascular disease Lab Results   Component Value Date    CHOLEST 214 (H) 04/26/2023    HDL 61 (H) 04/26/2023     (H) 04/26/2023    TRIG 169 (H) 04/26/2023         Electrocardiogram (EKG)   Covered if needed at Welcome to Medicare, and non-screening if indicated for medical reasons -      Ultrasound Screening for Abdominal Aortic Aneurysm (AAA) Covered once in a lifetime for one of the following risk factors    Men who are 65-75 years old and have ever smoked    Anyone with a family history -     Colorectal Cancer Screening  Covered for ages 50-85; only need ONE of the following:    Colonoscopy   Covered every 10 years    Covered every 2 years if patient is at high risk or previous colonoscopy was abnormal -    Health Maintenance   Topic Date Due    Colorectal Cancer Screening  03/16/2034 (Originally 3/21/1950)       Flexible Sigmoidoscopy   Covered every 4 years -    Fecal Occult Blood Test Covered annually -   Bone Density Screening    Bone density screening    Covered every 2 years after age 65 if diagnosed with risk of osteoporosis or estrogen deficiency.    Covered yearly for long-term glucocorticoid medication use (Steroids) No results found for this or any previous visit.      Health Maintenance Due   Topic Date Due    DEXA Scan  Never done      Pap and Pelvic    Pap   Covered every 2 years for women at normal risk; Annually if at high risk -  No recommendations at this time    Chlamydia Annually if high risk -  No recommendations at this time   Screening Mammogram    Mammogram     Recommend annually for all female patients aged 40 and older    One  baseline mammogram covered for patients aged 35-39 10/27/2022    Health Maintenance   Topic Date Due    Mammogram  Discontinued       Immunizations    Influenza Covered once per flu season  Please get every year -  No recommendations at this time    Pneumococcal Each vaccine ([id number] & [id number]) covered once after 65 Prevnar 13: 01/15/2019    [id number]: -     Pneumococcal Vaccination(2 of 2 - PPSV23 or PCV20) due on 01/15/2020    Hepatitis B One screening covered for patients with certain risk factors   -  No recommendations at this time    Tetanus Toxoid Not covered by Medicare Part B unless medically necessary (cut with metal); may be covered with your pharmacy prescription benefits -    Tetanus, Diptheria and Pertusis TD and TDaP Not covered by Medicare Part B -  No recommendations at this time    Zoster Not covered by Medicare Part B; may be covered with your pharmacy  prescription benefits -  Zoster Vaccines(1 of 2) Never done           pp educational materials provided

## 2024-08-31 NOTE — TELEPHONE ENCOUNTER
metoprolol succinate ER 25 MG Oral Tablet 24 Hr, Take  1/2 tab po daily, Disp: 45 tablet, Rfl: 0

## 2024-09-03 RX ORDER — METOPROLOL SUCCINATE 25 MG/1
TABLET, EXTENDED RELEASE ORAL
Qty: 45 TABLET | Refills: 3 | Status: SHIPPED | OUTPATIENT
Start: 2024-09-03

## 2024-09-03 NOTE — TELEPHONE ENCOUNTER
Please review; protocol failed/ has no protocol    Requested Prescriptions   Pending Prescriptions Disp Refills    metoprolol succinate ER 25 MG Oral Tablet 24 Hr 45 tablet 0     Sig: Take  1/2 tab po daily       Hypertension Medications Protocol Failed - 9/2/2024 12:19 PM        Failed - Last BP reading less than 140/90     BP Readings from Last 1 Encounters:   06/11/24 (!) 176/92               Passed - CMP or BMP in past 12 months        Passed - In person appointment or virtual visit in the past 12 mos or appointment in next 3 mos     Recent Outpatient Visits              2 months ago Medicare annual wellness visit, subsequent    [facility], [address], [place] Erin Chao MD    Office Visit    5 months ago Lymphocytosis    [facility]    Nurse Only    5 months ago Monoclonal B-cell lymphocytosis    [facility] Dax Schulz MD    Office Visit    5 months ago Postoperative examination    [facility] Sukhjinder Palacios MD    Office Visit    5 months ago History of repair of hiatal hernia    [facility], [address], [place] Erin Chao MD    Office Visit          Future Appointments         Provider Department Appt Notes    In 1 month CMA RESOURCE [facility]ology [facility] [id number]    In 1 month Dax Schulz MD [facility]cology FOLLOW UP VISIT.CL  6M                    Passed - EGFRCR or GFRNAA > 50     GFR Evaluation  EGFRCR: 72 , resulted on 3/28/2024             Recent Outpatient Visits              2 months ago Medicare annual wellness visit, subsequent    [facility]    Office Visit    5 months ago Lymphocytosis    [facility]    Nurse Only    5 months ago Monoclonal B-cell lymphocytosis    [facility] Dax Schulz MD     Office Visit    5 months ago Postoperative examination    [facility] Sukhjinder Palacios MD    Office Visit    5 months ago History of repair of hiatal hernia    [facility] [place] Kandel, Ninel, MD    Office Visit          Future Appointments         Provider Department Appt Notes    In 1 month CMA RESOURCE [facility] Hematology Oncology [id number]    In 1 month Dax Schulz MD [facility]ematology Oncology FOLLOW UP VISIT.CL  6M

## 2024-09-24 ENCOUNTER — LAB ENCOUNTER (OUTPATIENT)
Dept: LAB | Age: 74
End: 2024-09-24
Attending: INTERNAL MEDICINE
Payer: MEDICARE

## 2024-09-24 DIAGNOSIS — D72.820 MONOCLONAL B-CELL LYMPHOCYTOSIS: ICD-10-CM

## 2024-09-24 LAB
ALBUMIN SERPL-MCNC: 4.3 G/DL (ref 3.2–4.8)
ALBUMIN/GLOB SERPL: 1.7 {RATIO} (ref 1–2)
ALP LIVER SERPL-CCNC: 90 U/L
ALT SERPL-CCNC: 14 U/L
ANION GAP SERPL CALC-SCNC: 6 MMOL/L (ref 0–18)
AST SERPL-CCNC: 20 U/L (ref ?–34)
BASOPHILS # BLD AUTO: 0.1 X10(3) UL (ref 0–0.2)
BASOPHILS NFR BLD AUTO: 0.9 %
BILIRUB SERPL-MCNC: 0.5 MG/DL (ref 0.2–1.1)
BUN BLD-MCNC: 18 MG/DL (ref 9–23)
BUN/CREAT SERPL: 18.6 (ref 10–20)
CALCIUM BLD-MCNC: 9.4 MG/DL (ref 8.7–10.4)
CHLORIDE SERPL-SCNC: 106 MMOL/L (ref 98–112)
CO2 SERPL-SCNC: 29 MMOL/L (ref 21–32)
CREAT BLD-MCNC: 0.97 MG/DL
DEPRECATED RDW RBC AUTO: 42.4 FL (ref 35.1–46.3)
EGFRCR SERPLBLD CKD-EPI 2021: 61 ML/MIN/1.73M2 (ref 60–?)
EOSINOPHIL # BLD AUTO: 0.2 X10(3) UL (ref 0–0.7)
EOSINOPHIL NFR BLD AUTO: 1.8 %
ERYTHROCYTE [DISTWIDTH] IN BLOOD BY AUTOMATED COUNT: 12.6 % (ref 11–15)
FASTING STATUS PATIENT QL REPORTED: YES
GLOBULIN PLAS-MCNC: 2.5 G/DL (ref 2–3.5)
GLUCOSE BLD-MCNC: 97 MG/DL (ref 70–99)
HCT VFR BLD AUTO: 42.7 %
HGB BLD-MCNC: 14.6 G/DL
IMM GRANULOCYTES # BLD AUTO: 0.03 X10(3) UL (ref 0–1)
IMM GRANULOCYTES NFR BLD: 0.3 %
LDH SERPL L TO P-CCNC: 169 U/L
LYMPHOCYTES # BLD AUTO: 6.28 X10(3) UL (ref 1–4)
LYMPHOCYTES NFR BLD AUTO: 57.7 %
MCH RBC QN AUTO: 31.3 PG (ref 26–34)
MCHC RBC AUTO-ENTMCNC: 34.2 G/DL (ref 31–37)
MCV RBC AUTO: 91.4 FL
MONOCYTES # BLD AUTO: 0.64 X10(3) UL (ref 0.1–1)
MONOCYTES NFR BLD AUTO: 5.9 %
NEUTROPHILS # BLD AUTO: 3.64 X10 (3) UL (ref 1.5–7.7)
NEUTROPHILS # BLD AUTO: 3.64 X10(3) UL (ref 1.5–7.7)
NEUTROPHILS NFR BLD AUTO: 33.4 %
OSMOLALITY SERPL CALC.SUM OF ELEC: 294 MOSM/KG (ref 275–295)
PLATELET # BLD AUTO: 199 10(3)UL (ref 150–450)
POTASSIUM SERPL-SCNC: 4 MMOL/L (ref 3.5–5.1)
PROT SERPL-MCNC: 6.8 G/DL (ref 5.7–8.2)
RBC # BLD AUTO: 4.67 X10(6)UL
SODIUM SERPL-SCNC: 141 MMOL/L (ref 136–145)
WBC # BLD AUTO: 10.9 X10(3) UL (ref 4–11)

## 2024-09-24 PROCEDURE — 88189 FLOWCYTOMETRY/READ 16 & >: CPT

## 2024-09-24 PROCEDURE — 88185 FLOWCYTOMETRY/TC ADD-ON: CPT

## 2024-09-24 PROCEDURE — 36415 COLL VENOUS BLD VENIPUNCTURE: CPT

## 2024-09-24 PROCEDURE — 85025 COMPLETE CBC W/AUTO DIFF WBC: CPT

## 2024-09-24 PROCEDURE — 80053 COMPREHEN METABOLIC PANEL: CPT

## 2024-09-24 PROCEDURE — 83615 LACTATE (LD) (LDH) ENZYME: CPT

## 2024-09-24 PROCEDURE — 88184 FLOWCYTOMETRY/ TC 1 MARKER: CPT

## 2024-09-26 LAB
CD10 CELLS NFR SPEC: <1 %
CD10/CD19: <1 %
CD19 CELLS NFR SPEC: 68 %
CD19+/CD200+: 65 %
CD2 CELLS NFR SPEC: 33 %
CD20 CELLS NFR SPEC: 68 %
CD200 CELLS: 67 %
CD3 CELLS NFR SPEC: 25 %
CD3+/TCRGD+: 1 %
CD3+CD4+ CELLS NFR SPEC: 12 %
CD3+CD4+ CELLS/CD3+CD8+ CLL SPEC: 1.1
CD3+CD8+ CELLS NFR SPEC: 11 %
CD3-/CD56+: 10 %
CD33 CELLS NFR SPEC: 0 %
CD33+/CD34+ MDS CELS: 0 %
CD34 CELLS NFR SPEC: 0 %
CD34 CELLS NFR SPEC: <1 %
CD38 CELLS NFR SPEC: 3 %
CD38+/CD19+: <1 %
CD45 CELLS NFR SPEC: 100 %
CD5 CELLS NFR SPEC: 86 %
CD5/CD19 CELLS: 64 %
CD7 CELLS NFR SPEC: 34 %
CELL SURF KAPPA/LAMBDA RATIO: 34
CELL SURF LAMBDA LIGHT CHAIN: 2 %
CELL SURFACE KAPPA LIGHT CHAIN: 68 %
TCR G-D CELLS NFR SPEC: 1 %

## 2024-10-02 ENCOUNTER — TELEPHONE (OUTPATIENT)
Dept: HEMATOLOGY/ONCOLOGY | Facility: HOSPITAL | Age: 74
End: 2024-10-02

## 2024-10-02 NOTE — TELEPHONE ENCOUNTER
Called and spoke with Vijaya. Told her she is scheduled for a lab appointment at 9:30 am tomorrow before her follow up. She stated she already completed her labs. Told her I will cancel her lab appointment and she can come for her follow up that's scheduled for 10:20 am tomorrow. She stated understanding and thanked me for the call.

## 2024-10-03 ENCOUNTER — APPOINTMENT (OUTPATIENT)
Dept: HEMATOLOGY/ONCOLOGY | Facility: HOSPITAL | Age: 74
End: 2024-10-03
Attending: INTERNAL MEDICINE
Payer: MEDICARE

## 2024-10-03 VITALS
WEIGHT: 142.19 LBS | DIASTOLIC BLOOD PRESSURE: 70 MMHG | OXYGEN SATURATION: 99 % | TEMPERATURE: 97 F | BODY MASS INDEX: 26.17 KG/M2 | HEIGHT: 62 IN | HEART RATE: 92 BPM | RESPIRATION RATE: 16 BRPM | SYSTOLIC BLOOD PRESSURE: 145 MMHG

## 2024-10-03 DIAGNOSIS — Z87.898 HISTORY OF WEIGHT LOSS: ICD-10-CM

## 2024-10-03 DIAGNOSIS — D72.820 MONOCLONAL B-CELL LYMPHOCYTOSIS: Primary | ICD-10-CM

## 2024-10-03 PROCEDURE — 99214 OFFICE O/P EST MOD 30 MIN: CPT | Performed by: INTERNAL MEDICINE

## 2024-10-03 NOTE — PROGRESS NOTES
Cancer Center Progress Note    Patient Name: Vijaya Shipley   YOB: 1950   Medical Record Number: [id number]   CSN: [id number]   Consulting Physician: Dax Schulz MD  Referring Physician(s): No ref. provider found  Date of Visit:  10/3/2024     Chief Complaint:  Monoclonal B- Cell Lymphocytosis    History of Present Illness:     Vijaya Shipley is a 74 year old female that was seen today in the Cancer Center for evaluation of the above condition. Patient gets some GERD symptoms due to the hiatal hernia with plans for surgery by Dr. Palacios in place. Patient has PMH relevant for ovarian cancer status post total abdominal hysterectomy with bilateral salpingo-oophorectomy in 2003 as well as history of allergic rhinitis.  She mentions her sister has CLL and the patient is quite concerned she has the same condition based on increased absolute lymphocytosis from September with a value with 5500 to current results with a value of 6070.  Remainder of her differential is within normal limits as well as WBC, hemoglobin/hematocrit and platelets.  Vijaya reports being in her usual state of health over the last 6 to 12 months. Patient denies systemic signs of illness, regular bleeding, concerns for chest pain, dyspnea, nausea, vomiting or abdominal pain.    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 Pt denies systemic signs of illness.  No reports of bleeding, fever, infection, or lymphadenopathy.  Patient has no concerns for chest pain, dyspnea, nausea, vomiting, abdominal pain, or other concerns on ROS.    Past Medical History:  Past Medical History:    Allergic rhinitis    Cataract    Esophageal reflux    Hiatal hernia    Ovarian cancer (HCC)    s/p surgery alone (total abdominal hysterectomy with bilateral salpingo oopherectomy)    Visual impairment    readers       Past Surgical History:  Past Surgical History:   Procedure Laterality Date    Colonoscopy  2017     Egd  2017    Hernia surgery      Hysterectomy      Other      removal of the ovaries    Other surgical history  2014    septoplasty , dr contreras     Other surgical history      deviated septum -- rhinoplasty    Total abdom hysterectomy         Family Medical History:  Family History   Problem Relation Age of Onset    Cancer Mother 57        cervical    Cancer Father 72        lung cancer;tobacco use    Cancer Sister 79        CLL    Crohn's Disease Daughter     Bleeding Disorders Neg     DVT/VTE Neg        Gyne History:  OB History   No obstetric history on file.       Social History:  Social History     Socioeconomic History    Marital status:      Spouse name: Not on file    Number of children: Not on file    Years of education: Not on file    Highest education level: Not on file   Occupational History    Not on file   Tobacco Use    Smoking status: Never     Passive exposure: Never    Smokeless tobacco: Never   Vaping Use    Vaping status: Never Used   Substance and Sexual Activity    Alcohol use: No    Drug use: No    Sexual activity: Not on file   Other Topics Concern    Grew up on a farm Not Asked    History of tanning Not Asked    Outdoor occupation Not Asked    Breast feeding No    Reaction to local anesthetic No    Pt has a pacemaker No    Pt has a defibrillator No   Social History Narrative    Vijaya is  to Jamey. She has 3 adult children. Patient is retired from being a . She lives with her  in [place], [place].     Social Determinants of Health     Financial Resource Strain: Not on file   Food Insecurity: No Food Insecurity (2/7/2024)    Food Insecurity     Food Insecurity: Never true   Transportation Needs: No Transportation Needs (2/7/2024)    Transportation Needs     Lack of Transportation: No   Physical Activity: Not on file   Stress: Not on file   Social Connections: Not on file   Housing Stability: Low Risk  (2/7/2024)    Housing Stability     Housing Instability: No      Housing Instability Emergency: Not on file     Crib or [place]inette: Not on file       Allergies:   Allergies   Allergen Reactions    Seasonal        Current Medications:   metoprolol succinate ER 25 MG Oral Tablet 24 Hr Take  1/2 tab po daily 45 tablet 3    PANTOPRAZOLE 40 MG Oral Tab EC TAKE 1 TABLET BY MOUTH EVERY DAY IN THE MORNING BEFORE BREAKFAST 90 tablet 3    calcium carbonate 500 MG Oral Chew Tab Chew 1 tablet (500 mg total) by mouth daily.      famotidine 40 MG Oral Tab TAKE 1 TABLET BY MOUTH EVERY DAY 90 tablet 3    LEVOCETIRIZINE 5 MG Oral Tab TAKE 1 TABLET BY MOUTH EVERY DAY IN THE EVENING 90 tablet 1       Review of Systems:    Constitutional: Negative for anorexia, chills, fatigue, fevers, night sweats and no weight loss  Eyes: Negative for visual disturbance, irritation and redness.  Respiratory: Negative for cough, hemoptysis, chest pain, or dyspnea on exertion.  Gastrointestinal: Negative for dysphagia, odynophagia, reflux symptoms, nausea, vomiting, change in bowel habits, diarrhea, constipation and abdominal pain.  Integument/breast: Negative for rash, skin lesions, and pruritus.  Hematologic/lymphatic: Negative for easy bruising, bleeding, and lymphadenopathy.  Musculoskeletal: Negative for myalgias, arthralgias, muscle weakness.  Neurological: Negative for headaches, dizziness, speech problems, gait problems and weakness.    A comprehensive 14 point review of systems was completed.  Pertinent positives and negatives noted in the the HPI.     Vital Signs:  /70 (BP Location: Left arm, Patient Position: Sitting, Cuff Size: adult)   Pulse 92   Temp 97.2 °F (36.2 °C) (Oral)   Resp 16   Ht 1.575 m (5' 2\")   Wt 64.5 kg (142 lb 3.2 oz)   SpO2 99%   BMI 26.01 kg/m²     Physical Examination:    General: Patient is alert and oriented x 3, not in acute distress.  Psych: Friendly, cooperative with appropriate questions and responses  HEENT: EOMs intact. Oropharynx is clear.   Neck:  No  palpable lymphadenopathy. Neck is supple.  Lymphatics: There is no palpable lymphadenopathy throughout in the cervical, supraclavicular, axillary, or inguinal regions.  Chest: Clear to auscultation. No wheezes or rales.  Heart: Regular rate and rhythm. S1S2 normal.  Abdomen: Soft, non tender with good bowel sounds.  No hepatosplenomegaly.  No palpable mass.  Extremities: No edema or calf tenderness.  Neurological: Grossly intact.     Performance Status:    ECOG 0: Fully active, able to carry on all pre-disease performance without restriction      Labs:    Lab Results   Component Value Date/Time    WBC 10.9 09/24/2024 11:13 AM    RBC 4.67 09/24/2024 11:13 AM    HGB 14.6 09/24/2024 11:13 AM    HCT 42.7 09/24/2024 11:13 AM    MCV 91.4 09/24/2024 11:13 AM    MCH 31.3 09/24/2024 11:13 AM    MCHC 34.2 09/24/2024 11:13 AM    RDW 12.6 09/24/2024 11:13 AM    NEPRELIM 3.64 09/24/2024 11:13 AM    .0 09/24/2024 11:13 AM       Lab Results   Component Value Date/Time    GLU 97 09/24/2024 11:13 AM    BUN 18 09/24/2024 11:13 AM    CREATSERUM 0.97 09/24/2024 11:13 AM    GFRNAA 58 (L) 03/16/2022 02:06 PM    CA 9.4 09/24/2024 11:13 AM    ALB 4.3 09/24/2024 11:13 AM     09/24/2024 11:13 AM    K 4.0 09/24/2024 11:13 AM     09/24/2024 11:13 AM    CO2 29.0 09/24/2024 11:13 AM    ALKPHO 90 09/24/2024 11:13 AM    AST 20 09/24/2024 11:13 AM    ALT 14 09/24/2024 11:13 AM       Imaging:    N/A    Pathology:    Flow cytometry from 12/23 reviewed w/ pt consistent with monoclonal B-Cell Lymphocytosis    Impression:      ICD-10-CM    1. Monoclonal B-cell lymphocytosis  D72.820 CBC With Differential With Platelet     Comp Metabolic Panel (14)     LDH     LEUKEMIA LYMPHOMA FLOW BLOOD [E]            74 year old W with PMH relevant for ovarian cancer status post total abdominal hysterectomy with bilateral salpingo-oophorectomy in 2003 as well as history of allergic rhinitis presents for an evaluation of Monoclonal B- Cell  Lymphocytosis    Plan:    1.) Monoclonal B-Cell Lymphocytosis    --reviewed w/ pt after consultation in 12/23 that her results are consistent with monoclonal B-Cell Lymphocytosis by flow cytometry  --does not have chronic hepatitis or HIV  --no signs of end organ damage noted at consultation  --f/u today w/o systemic signs of illness, no signs fo anemia or thrombocytopenia.     --Her absolute lymphocyte count is over 5K, but flow cytometry confirms that the monoclonal population of B- Cells is slightly over 4200, so not diagnostic for CLL diagnosis at this time  --f/u in 6 mo w/ repeat eval and lab testing  --if she meets criteria for CLL, would not need tx until she developed systemic signs of illness, anemia or thrombocytopenia    2.) hx of weight loss s/p hernia repair surgery    --reviewed pathology from 2/7; does not show signs of malignancy   --pt had some mild weight loss in 3/24 due to recent surgery in 2/24; no further weight loss noted as her weight has inc by 11lbs, no concerns for this to be a systemic sign of illness related to above diagnosis    MDM: Moderate     Dax Schulz MD  [facility]  [place]isa KALYN [address] [address]

## 2024-10-14 RX ORDER — FAMOTIDINE 40 MG/1
TABLET, FILM COATED ORAL
Qty: 90 TABLET | Refills: 3 | Status: SHIPPED | OUTPATIENT
Start: 2024-10-14

## 2024-10-14 NOTE — TELEPHONE ENCOUNTER
REFILL PASSED PER formerly [facility] PROTOCOLS     Please review pended refill request as unable to refill due to high/very high drug interaction warning copied here;     Duplicate Therapy: famotidine, pantoprazolePEPTIC ULCER AGENTS. No Abuse/Dependency Potential.  Details     Requested Prescriptions   Pending Prescriptions Disp Refills    FAMOTIDINE 40 MG Oral Tab [Pharmacy Med Name: FAMOTIDINE 40 MG TABLET] 90 tablet 3     Sig: TAKE 1 TABLET BY MOUTH EVERY DAY       Gastrointestional Medication Protocol Passed - 10/14/2024  9:18 AM        Passed - In person appointment or virtual visit in the past 12 mos or appointment in next 3 mos     Recent Outpatient Visits              1 week ago Monoclonal B-cell lymphocytosis    [facility] Dax Schulz MD    Office Visit    4 months ago Medicare annual wellness visit, subsequent    [facility]    6 months ago Lymphocytosis    [facility]    Nurse Only    6 months ago Monoclonal B-cell lymphocytosis    [facility] Dax Schulz MD    Office Visit    6 months ago Postoperative examination    [facility] [place] Sukhjinder Palacios MD    Office Visit          Future Appointments         Provider Department Appt Notes    In 5 months Dax Schulz MD [facility]ematology Oncology FOLLOW UP VISIT.CL  6M                         Future Appointments         Provider Department Appt Notes    In 5 months Dax Schulz MD [facility] Hematology Oncology FOLLOW UP VISIT.CL  6M          Recent Outpatient Visits              1 week ago Monoclonal B-cell lymphocytosis    [facility] Dax Schulz MD    Office Visit    4 months ago Medicare annual wellness visit, subsequent    [facility] [place] Kandel, Ninel, MD    Office Visit    6  months ago Lymphocytosis    [facility]    Nurse Only    6 months ago Monoclonal B-cell lymphocytosis    [facility] Dax Schulz MD    Office Visit    6 months ago Postoperative examination    [facility] [place] Sukhjinder Palacios MD    Office Visit

## 2024-12-12 ENCOUNTER — TELEPHONE (OUTPATIENT)
Dept: INTERNAL MEDICINE CLINIC | Facility: CLINIC | Age: 74
End: 2024-12-12

## 2024-12-12 NOTE — TELEPHONE ENCOUNTER
Spoke to patient, denied appointment for HTN at this time. Will call back after the holidays to set up an appointment.

## 2025-02-11 ENCOUNTER — OFFICE VISIT (OUTPATIENT)
Dept: INTERNAL MEDICINE CLINIC | Facility: CLINIC | Age: 75
End: 2025-02-11

## 2025-02-11 VITALS
SYSTOLIC BLOOD PRESSURE: 132 MMHG | HEART RATE: 82 BPM | WEIGHT: 140.63 LBS | DIASTOLIC BLOOD PRESSURE: 76 MMHG | BODY MASS INDEX: 25.88 KG/M2 | HEIGHT: 62 IN

## 2025-02-11 DIAGNOSIS — R03.0 ELEVATED BLOOD-PRESSURE READING WITHOUT DIAGNOSIS OF HYPERTENSION: Primary | ICD-10-CM

## 2025-02-11 DIAGNOSIS — Z78.0 POST-MENOPAUSAL: ICD-10-CM

## 2025-02-11 PROCEDURE — 99213 OFFICE O/P EST LOW 20 MIN: CPT | Performed by: NURSE PRACTITIONER

## 2025-02-11 NOTE — ASSESSMENT & PLAN NOTE
Advised to continue Metoprolol 12.5 mg once daily and keep log. HR reported 70-80's. Check BP daily and keep log. DASH, hydrate, exercise. RTC in 6-8 weeks, schedule annual physical. Reviewed concerning s/s.

## 2025-02-11 NOTE — PROGRESS NOTES
Vijaya Shipley is a 74 year old female.  HPI:   Pt presents to clinic for f/u on her HTN. She is taking Metoprolol 12.5 mg daily. She reports home BP ranges from 120-145/70's. She reports most BP readings <130/80. She reports a few episodes of 's. Denies any CP, SOB, HA, dizziness, vision changes, palpitations, appetite or weight changes. She reports not watching diet or salt. Pt reports overall feeling well. Denies any complaints at this time.   Current Outpatient Medications   Medication Sig Dispense Refill    famotidine 40 MG Oral Tab TAKE 1 TABLET BY MOUTH EVERY DAY 90 tablet 3    metoprolol succinate ER 25 MG Oral Tablet 24 Hr Take  1/2 tab po daily 45 tablet 3    PANTOPRAZOLE 40 MG Oral Tab EC TAKE 1 TABLET BY MOUTH EVERY DAY IN THE MORNING BEFORE BREAKFAST 90 tablet 3    calcium carbonate 500 MG Oral Chew Tab Chew 1 tablet (500 mg total) by mouth daily.      LEVOCETIRIZINE 5 MG Oral Tab TAKE 1 TABLET BY MOUTH EVERY DAY IN THE EVENING 90 tablet 1      Past Medical History:    Allergic rhinitis    Cataract    Esophageal reflux    Hiatal hernia    Ovarian cancer (HCC)    s/p surgery alone (total abdominal hysterectomy with bilateral salpingo oopherectomy)    Visual impairment    readers      Social History:  Social History     Socioeconomic History    Marital status:    Tobacco Use    Smoking status: Never     Passive exposure: Never    Smokeless tobacco: Never   Vaping Use    Vaping status: Never Used   Substance and Sexual Activity    Alcohol use: No    Drug use: No   Other Topics Concern    Breast feeding No    Reaction to local anesthetic No    Pt has a pacemaker No    Pt has a defibrillator No   Social History Narrative    Vijaya is  to Jamey. She has 3 adult children. Patient is retired from being a . She lives with her  in [place], [place].     Social Drivers of Health     Food Insecurity: No Food Insecurity (2/7/2024)    Food Insecurity     Food Insecurity: Never  true   Transportation Needs: No Transportation Needs (2/7/2024)    Transportation Needs     Lack of Transportation: No   Housing Stability: Low Risk  (2/7/2024)    Housing Stability     Housing Instability: No        REVIEW OF SYSTEMS:   Review of Systems   Constitutional:  Negative for activity change, appetite change, fatigue, fever and unexpected weight change.   HENT:  Negative for congestion, dental problem and sore throat.    Eyes:  Negative for visual disturbance.   Respiratory:  Negative for cough, chest tightness, shortness of breath and wheezing.    Cardiovascular:  Negative for chest pain, palpitations and leg swelling.   Gastrointestinal:  Negative for abdominal pain, constipation, diarrhea, nausea and vomiting.   Endocrine: Negative.    Genitourinary:  Negative for difficulty urinating, frequency and menstrual problem.   Musculoskeletal:  Negative for arthralgias and back pain.   Skin:  Negative for color change, pallor, rash and wound.   Neurological:  Negative for dizziness, seizures, light-headedness, numbness and headaches.   Psychiatric/Behavioral:  Negative for behavioral problems, dysphoric mood and suicidal ideas. The patient is not nervous/anxious.           EXAM:   /76 (BP Location: Left arm, Patient Position: Sitting, Cuff Size: large)   Pulse 82   Ht 5' 2\" (1.575 m)   Wt 140 lb 9.6 oz (63.8 kg)   BMI 25.72 kg/m²     Physical Exam  Vitals reviewed.   Constitutional:       General: She is not in acute distress.     Appearance: Normal appearance.   HENT:      Head: Normocephalic.   Eyes:      General: No scleral icterus.     Conjunctiva/sclera: Conjunctivae normal.   Neck:      Vascular: No carotid bruit.   Cardiovascular:      Rate and Rhythm: Normal rate and regular rhythm.      Pulses: Normal pulses.      Heart sounds: Normal heart sounds. No murmur heard.  Pulmonary:      Effort: Pulmonary effort is normal. No respiratory distress.      Breath sounds: Normal breath sounds.    Musculoskeletal:      Cervical back: Normal range of motion and neck supple.      Right lower leg: No edema.      Left lower leg: No edema.   Skin:     General: Skin is warm.      Coloration: Skin is not jaundiced.   Neurological:      General: No focal deficit present.      Mental Status: She is alert and oriented to person, place, and time.      Gait: Gait normal.   Psychiatric:         Mood and Affect: Mood normal.         Behavior: Behavior normal.         Thought Content: Thought content normal.         Judgment: Judgment normal.            ASSESSMENT AND PLAN:     Assessment & Plan  Elevated blood-pressure reading without diagnosis of hypertension  Advised to continue Metoprolol 12.5 mg once daily and keep log. HR reported 70-80's. Check BP daily and keep log. DASH, hydrate, exercise. RTC in 6-8 weeks, schedule annual physical. Reviewed concerning s/s.        Post-menopausal  Pt declines, education provided.             The patient indicates understanding of these issues and agrees to the plan.  The patient is asked to return in 6-8 weeks..     The above note was creating using Dragon speech recognition technology. Please excuse any typos.

## 2025-03-24 ENCOUNTER — LAB ENCOUNTER (OUTPATIENT)
Dept: LAB | Age: 75
End: 2025-03-24
Attending: INTERNAL MEDICINE
Payer: MEDICARE

## 2025-03-24 DIAGNOSIS — D72.820 MONOCLONAL B-CELL LYMPHOCYTOSIS: ICD-10-CM

## 2025-03-24 LAB
ALBUMIN SERPL-MCNC: 4.4 G/DL (ref 3.2–4.8)
ALBUMIN/GLOB SERPL: 1.9 {RATIO} (ref 1–2)
ALP LIVER SERPL-CCNC: 87 U/L
ALT SERPL-CCNC: 15 U/L
ANION GAP SERPL CALC-SCNC: 8 MMOL/L (ref 0–18)
AST SERPL-CCNC: 17 U/L (ref ?–34)
BASOPHILS # BLD AUTO: 0.06 X10(3) UL (ref 0–0.2)
BASOPHILS NFR BLD AUTO: 0.6 %
BILIRUB SERPL-MCNC: 0.5 MG/DL (ref 0.2–1.1)
BUN BLD-MCNC: 21 MG/DL (ref 9–23)
BUN/CREAT SERPL: 20.6 (ref 10–20)
CALCIUM BLD-MCNC: 9.3 MG/DL (ref 8.7–10.4)
CD10 CELLS NFR SPEC: <1 %
CD10/CD19: <1 %
CD19 CELLS NFR SPEC: 60 %
CD19+/CD200+: 59 %
CD2 CELLS NFR SPEC: 37 %
CD20 CELLS NFR SPEC: 61 %
CD200 CELLS: 66 %
CD3 CELLS NFR SPEC: 28 %
CD3+/TCRGD+: 1 %
CD3+CD4+ CELLS NFR SPEC: 15 %
CD3+CD4+ CELLS/CD3+CD8+ CLL SPEC: 1.3
CD3+CD8+ CELLS NFR SPEC: 12 %
CD3-/CD56+: 10 %
CD34 CELLS NFR SPEC: <1 %
CD38 CELLS NFR SPEC: 4 %
CD38+/CD19+: <1 %
CD45 CELLS NFR SPEC: 100 %
CD5 CELLS NFR SPEC: 85 %
CD5/CD19 CELLS: 57 %
CD7 CELLS NFR SPEC: 42 %
CELL SURF KAPPA/LAMBDA RATIO: 29.5
CELL SURF LAMBDA LIGHT CHAIN: 2 %
CELL SURFACE KAPPA LIGHT CHAIN: 59 %
CHLORIDE SERPL-SCNC: 105 MMOL/L (ref 98–112)
CO2 SERPL-SCNC: 29 MMOL/L (ref 21–32)
CREAT BLD-MCNC: 1.02 MG/DL
DEPRECATED RDW RBC AUTO: 42.5 FL (ref 35.1–46.3)
EGFRCR SERPLBLD CKD-EPI 2021: 57 ML/MIN/1.73M2 (ref 60–?)
EOSINOPHIL # BLD AUTO: 0.18 X10(3) UL (ref 0–0.7)
EOSINOPHIL NFR BLD AUTO: 1.7 %
ERYTHROCYTE [DISTWIDTH] IN BLOOD BY AUTOMATED COUNT: 12.9 % (ref 11–15)
FASTING STATUS PATIENT QL REPORTED: YES
GLOBULIN PLAS-MCNC: 2.3 G/DL (ref 2–3.5)
GLUCOSE BLD-MCNC: 95 MG/DL (ref 70–99)
HCT VFR BLD AUTO: 42.5 %
HGB BLD-MCNC: 14.3 G/DL
IMM GRANULOCYTES # BLD AUTO: 0.02 X10(3) UL (ref 0–1)
IMM GRANULOCYTES NFR BLD: 0.2 %
LDH SERPL L TO P-CCNC: 166 U/L
LYMPHOCYTES # BLD AUTO: 7.06 X10(3) UL (ref 1–4)
LYMPHOCYTES NFR BLD AUTO: 65.7 %
MCH RBC QN AUTO: 30.2 PG (ref 26–34)
MCHC RBC AUTO-ENTMCNC: 33.6 G/DL (ref 31–37)
MCV RBC AUTO: 89.9 FL
MONOCYTES # BLD AUTO: 0.46 X10(3) UL (ref 0.1–1)
MONOCYTES NFR BLD AUTO: 4.3 %
NEUTROPHILS # BLD AUTO: 2.97 X10 (3) UL (ref 1.5–7.7)
NEUTROPHILS # BLD AUTO: 2.97 X10(3) UL (ref 1.5–7.7)
NEUTROPHILS NFR BLD AUTO: 27.5 %
OSMOLALITY SERPL CALC.SUM OF ELEC: 297 MOSM/KG (ref 275–295)
PLATELET # BLD AUTO: 190 10(3)UL (ref 150–450)
POTASSIUM SERPL-SCNC: 4 MMOL/L (ref 3.5–5.1)
PROT SERPL-MCNC: 6.7 G/DL (ref 5.7–8.2)
RBC # BLD AUTO: 4.73 X10(6)UL
SODIUM SERPL-SCNC: 142 MMOL/L (ref 136–145)
TCR G-D CELLS NFR SPEC: 1 %
WBC # BLD AUTO: 10.8 X10(3) UL (ref 4–11)

## 2025-03-24 PROCEDURE — 88184 FLOWCYTOMETRY/ TC 1 MARKER: CPT

## 2025-03-24 PROCEDURE — 88185 FLOWCYTOMETRY/TC ADD-ON: CPT

## 2025-03-24 PROCEDURE — 83615 LACTATE (LD) (LDH) ENZYME: CPT

## 2025-03-24 PROCEDURE — 88189 FLOWCYTOMETRY/READ 16 & >: CPT

## 2025-03-24 PROCEDURE — 36415 COLL VENOUS BLD VENIPUNCTURE: CPT

## 2025-03-24 PROCEDURE — 80053 COMPREHEN METABOLIC PANEL: CPT

## 2025-03-24 PROCEDURE — 85025 COMPLETE CBC W/AUTO DIFF WBC: CPT

## 2025-04-03 ENCOUNTER — OFFICE VISIT (OUTPATIENT)
Age: 75
End: 2025-04-03
Attending: INTERNAL MEDICINE
Payer: MEDICARE

## 2025-04-03 VITALS
DIASTOLIC BLOOD PRESSURE: 88 MMHG | BODY MASS INDEX: 25.95 KG/M2 | RESPIRATION RATE: 16 BRPM | WEIGHT: 141 LBS | TEMPERATURE: 97 F | HEART RATE: 91 BPM | SYSTOLIC BLOOD PRESSURE: 149 MMHG | HEIGHT: 62 IN | OXYGEN SATURATION: 98 %

## 2025-04-03 DIAGNOSIS — D72.820 MONOCLONAL B-CELL LYMPHOCYTOSIS: Primary | ICD-10-CM

## 2025-04-03 NOTE — PROGRESS NOTES
Cancer Center Progress Note    Patient Name: Vijaya Shipley   YOB: 1950   Medical Record Number: [id number]   CSN: [id number]   Consulting Physician: Dax Schulz MD  Referring Physician(s): No ref. provider found  Date of Visit:  4/3/2025     Chief Complaint:  Monoclonal B- Cell Lymphocytosis    History of Present Illness:     Vijaya Shipley is a 75 year old female that was seen today in the Cancer Center for evaluation of the above condition. Patient gets some GERD symptoms due to the hiatal hernia with plans for surgery by Dr. Palacios in place. Patient has PMH relevant for ovarian cancer status post total abdominal hysterectomy with bilateral salpingo-oophorectomy in 2003 as well as history of allergic rhinitis.  She mentions her sister has CLL and the patient is quite concerned she has the same condition based on increased absolute lymphocytosis from September with a value with 5500 to current results with a value of 6070.  Remainder of her differential is within normal limits as well as WBC, hemoglobin/hematocrit and platelets.  Vijaya reports being in her usual state of health over the last 6 to 12 months. Patient denies systemic signs of illness, regular bleeding, concerns for chest pain, dyspnea, nausea, vomiting or abdominal pain.    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 Pt denies systemic signs of illness.  No reports of bleeding, fever, infection, or lymphadenopathy.  Patient has no concerns for chest pain, dyspnea, nausea, vomiting, abdominal pain, or other concerns on ROS.    Past Medical History:  Past Medical History:    Allergic rhinitis    Cataract    Esophageal reflux    Hiatal hernia    Ovarian cancer (HCC)    s/p surgery alone (total abdominal hysterectomy with bilateral salpingo oopherectomy)    Visual impairment    readers       Past Surgical History:  Past Surgical History:   Procedure Laterality Date    Colonoscopy  2017    Egd   2017    Hernia surgery      Hysterectomy      Other      removal of the ovaries    Other surgical history  2014    septoplasty , dr contreras     Other surgical history      deviated septum -- rhinoplasty    Total abdom hysterectomy         Family Medical History:  Family History   Problem Relation Age of Onset    Cancer Mother 57        cervical    Cancer Father 72        lung cancer;tobacco use    Cancer Sister 79        CLL    Crohn's Disease Daughter     Bleeding Disorders Neg     DVT/VTE Neg        Gyne History:  OB History   No obstetric history on file.       Social History:  Social History     Socioeconomic History    Marital status:      Spouse name: Not on file    Number of children: Not on file    Years of education: Not on file    Highest education level: Not on file   Occupational History    Not on file   Tobacco Use    Smoking status: Never     Passive exposure: Never    Smokeless tobacco: Never   Vaping Use    Vaping status: Never Used   Substance and Sexual Activity    Alcohol use: No    Drug use: No    Sexual activity: Not on file   Other Topics Concern    Grew up on a farm Not Asked    History of tanning Not Asked    Outdoor occupation Not Asked    Breast feeding No    Reaction to local anesthetic No    Pt has a pacemaker No    Pt has a defibrillator No   Social History Narrative    Vijaya is  to Jamey. She has 3 adult children. Patient is retired from being a . She lives with her  in [place], [place].     Social Drivers of Health     Food Insecurity: No Food Insecurity (2/7/2024)    Food Insecurity     Food Insecurity: Never true   Transportation Needs: No Transportation Needs (2/7/2024)    Transportation Needs     Lack of Transportation: No   Housing Stability: Low Risk  (2/7/2024)    Housing Stability     Housing Instability: No     Housing Instability Emergency: Not on file     Crib or Bassinette: Not on file       Allergies:   Allergies   Allergen Reactions     Seasonal        Current Medications:   famotidine 40 MG Oral Tab TAKE 1 TABLET BY MOUTH EVERY DAY 90 tablet 3    metoprolol succinate ER 25 MG Oral Tablet 24 Hr Take  1/2 tab po daily (Patient taking differently: Take 1 tablet (25 mg total) by mouth daily.) 45 tablet 3    PANTOPRAZOLE 40 MG Oral Tab EC TAKE 1 TABLET BY MOUTH EVERY DAY IN THE MORNING BEFORE BREAKFAST 90 tablet 3    calcium carbonate 500 MG Oral Chew Tab Chew 1 tablet (500 mg total) by mouth daily.      LEVOCETIRIZINE 5 MG Oral Tab TAKE 1 TABLET BY MOUTH EVERY DAY IN THE EVENING 90 tablet 1       Review of Systems:    Constitutional: Negative for anorexia, chills, fatigue, fevers, night sweats and no weight loss  Eyes: Negative for visual disturbance, irritation and redness.  Respiratory: Negative for cough, hemoptysis, chest pain, or dyspnea on exertion.  Gastrointestinal: Negative for dysphagia, odynophagia, reflux symptoms, nausea, vomiting, change in bowel habits, diarrhea, constipation and abdominal pain.  Integument/breast: Negative for rash, skin lesions, and pruritus.  Hematologic/lymphatic: Negative for easy bruising, bleeding, and lymphadenopathy.  Musculoskeletal: Negative for myalgias, arthralgias, muscle weakness.  Neurological: Negative for headaches, dizziness, speech problems, gait problems and weakness.    A comprehensive 14 point review of systems was completed.  Pertinent positives and negatives noted in the the HPI.     Vital Signs:  /88 (BP Location: Left arm, Patient Position: Sitting, Cuff Size: adult)   Pulse 91   Temp 97.4 °F (36.3 °C) (Oral)   Resp 16   Ht 1.575 m (5' 2\")   Wt 64 kg (141 lb)   SpO2 98%   BMI 25.79 kg/m²     Physical Examination:    General: Patient is alert and oriented x 3, not in acute distress.  Psych: Friendly, cooperative with appropriate questions and responses  HEENT: EOMs intact. Oropharynx is clear.   Neck:  No palpable lymphadenopathy. Neck is supple.  Lymphatics: There is no palpable  lymphadenopathy throughout in the cervical, supraclavicular, axillary, or inguinal regions.  Chest: Clear to auscultation. No wheezes or rales.  Heart: Regular rate and rhythm. S1S2 normal.  Abdomen: Soft, non tender with good bowel sounds.  No hepatosplenomegaly.  No palpable mass.  Extremities: No edema or calf tenderness.  Neurological: Grossly intact.     Performance Status:    ECOG 0: Fully active, able to carry on all pre-disease performance without restriction      Labs:    Lab Results   Component Value Date/Time    WBC 10.8 03/24/2025 11:09 AM    RBC 4.73 03/24/2025 11:09 AM    HGB 14.3 03/24/2025 11:09 AM    HCT 42.5 03/24/2025 11:09 AM    MCV 89.9 03/24/2025 11:09 AM    MCH 30.2 03/24/2025 11:09 AM    MCHC 33.6 03/24/2025 11:09 AM    RDW 12.9 03/24/2025 11:09 AM    NEPRELIM 2.97 03/24/2025 11:09 AM    .0 03/24/2025 11:09 AM       Lab Results   Component Value Date/Time    GLU 95 03/24/2025 11:09 AM    BUN 21 03/24/2025 11:09 AM    CREATSERUM 1.02 03/24/2025 11:09 AM    GFRNAA 58 (L) 03/16/2022 02:06 PM    CA 9.3 03/24/2025 11:09 AM    ALB 4.4 03/24/2025 11:09 AM     03/24/2025 11:09 AM    K 4.0 03/24/2025 11:09 AM     03/24/2025 11:09 AM    CO2 29.0 03/24/2025 11:09 AM    ALKPHO 87 03/24/2025 11:09 AM    AST 17 03/24/2025 11:09 AM    ALT 15 03/24/2025 11:09 AM       Imaging:    N/A    Pathology:    Flow cytometry from 3/24/25 reviewed w/ pt consistent with monoclonal B-Cell Lymphocytosis    Impression:      ICD-10-CM    1. Monoclonal B-cell lymphocytosis  D72.820 CBC With Differential With Platelet     LDH              75 year old W with PMH relevant for ovarian cancer status post total abdominal hysterectomy with bilateral salpingo-oophorectomy in 2003 as well as history of allergic rhinitis presents for an evaluation of Monoclonal B- Cell Lymphocytosis    Plan:    1.) Monoclonal B-Cell Lymphocytosis    --reviewed w/ pt after consultation in 12/23 that her results are consistent with  monoclonal B-Cell Lymphocytosis by flow cytometry  --does not have chronic hepatitis or HIV  --no signs of end organ damage noted at consultation  --f/u today w/o systemic signs of illness, no signs fo anemia or thrombocytopenia.     --Her absolute lymphocyte count is over 5K, but flow cytometry confirms that the monoclonal population of B- Cells is slightly over 4200, so not diagnostic for CLL diagnosis at this time  --f/u in 6 mo w/ repeat eval and lab testing  --if she meets criteria for CLL, would not need tx until she developed systemic signs of illness, anemia or thrombocytopenia    2.) hx of weight loss s/p hernia repair surgery    --reviewed pathology from 2/7; does not show signs of malignancy   --pt had some mild weight loss in 3/24 due to recent surgery in 2/24; no further weight loss noted as her weight has inc by 11lbs in 10/2024, no concerns for this to be a systemic sign of illness related to above diagnosis  --weight has been stable    MDM: Efraín Schulz MD  [facility]  [facility]  [address] [place] [id number]

## 2025-04-11 ENCOUNTER — OFFICE VISIT (OUTPATIENT)
Dept: INTERNAL MEDICINE CLINIC | Facility: CLINIC | Age: 75
End: 2025-04-11

## 2025-04-11 VITALS
BODY MASS INDEX: 26.02 KG/M2 | HEIGHT: 62 IN | DIASTOLIC BLOOD PRESSURE: 84 MMHG | WEIGHT: 141.38 LBS | SYSTOLIC BLOOD PRESSURE: 155 MMHG | HEART RATE: 98 BPM

## 2025-04-11 DIAGNOSIS — Z87.19 HISTORY OF REPAIR OF HIATAL HERNIA: ICD-10-CM

## 2025-04-11 DIAGNOSIS — Z98.890 HISTORY OF REPAIR OF HIATAL HERNIA: ICD-10-CM

## 2025-04-11 DIAGNOSIS — I10 PRIMARY HYPERTENSION: Primary | ICD-10-CM

## 2025-04-11 PROCEDURE — 99213 OFFICE O/P EST LOW 20 MIN: CPT | Performed by: INTERNAL MEDICINE

## 2025-04-11 PROCEDURE — G2211 COMPLEX E/M VISIT ADD ON: HCPCS | Performed by: INTERNAL MEDICINE

## 2025-04-11 RX ORDER — METOPROLOL SUCCINATE 25 MG/1
TABLET, EXTENDED RELEASE ORAL
Qty: 135 TABLET | Refills: 3 | Status: SHIPPED | OUTPATIENT
Start: 2025-04-11

## 2025-04-11 NOTE — PROGRESS NOTES
Subjective:     Patient ID: Vijaya Shipley is a 75 year old female.  Presents for follow-up on hypertension    Patient feels well overall.  After hiatal hernia repair repair stomach feels good, she has been taking metoprolol succinate ER 25 mg daily.  Monitors blood pressure at home at times it is never below 140/85.  She may feel anxious frequently.  Tolerates current dose well.      Current Medications[1]  Allergies:Allergies[2]    Past Medical History[3]   Past Surgical History[4]   Family History[5]   Social History: Short Social Hx on File[6]     Objective:   Physical Exam  Constitutional:       Appearance: Normal appearance.   HENT:      Head: Normocephalic and atraumatic.   Eyes:      General: No scleral icterus.     Extraocular Movements: Extraocular movements intact.      Pupils: Pupils are equal, round, and reactive to light.   Cardiovascular:      Rate and Rhythm: Normal rate and regular rhythm.      Heart sounds: No murmur heard.  Pulmonary:      Effort: No respiratory distress.      Breath sounds: No wheezing or rhonchi.   Musculoskeletal:         General: Normal range of motion.      Cervical back: Normal range of motion and neck supple.   Skin:     General: Skin is warm.      Coloration: Skin is not jaundiced.   Neurological:      General: No focal deficit present.      Mental Status: She is alert and oriented to person, place, and time.   Psychiatric:         Mood and Affect: Mood normal.         Behavior: Behavior normal.         Judgment: Judgment normal.         Assessment & Plan:   1. Primary hypertension uncontrolled, advised patient to increase metoprolol and take 37.5 mg daily monitor blood pressure and heart rate report in 2 weeks       Follow-up in 6 months    Meds This Visit:  Requested Prescriptions     Signed Prescriptions Disp Refills    metoprolol succinate ER 25 MG Oral Tablet 24 Hr 135 tablet 3     Sig: Take  1  1/2 tab po daily       Imaging & Referrals:  None            [1]    Current Outpatient Medications   Medication Sig Dispense Refill    metoprolol succinate ER 25 MG Oral Tablet 24 Hr Take  1  1/2 tab po daily 135 tablet 3    famotidine 40 MG Oral Tab TAKE 1 TABLET BY MOUTH EVERY DAY 90 tablet 3    metoprolol succinate ER 25 MG Oral Tablet 24 Hr Take  1/2 tab po daily (Patient taking differently: Take 1 tablet (25 mg total) by mouth daily.) 45 tablet 3    PANTOPRAZOLE 40 MG Oral Tab EC TAKE 1 TABLET BY MOUTH EVERY DAY IN THE MORNING BEFORE BREAKFAST 90 tablet 3    calcium carbonate 500 MG Oral Chew Tab Chew 1 tablet (500 mg total) by mouth daily.      LEVOCETIRIZINE 5 MG Oral Tab TAKE 1 TABLET BY MOUTH EVERY DAY IN THE EVENING 90 tablet 1   [2]   Allergies  Allergen Reactions    Seasonal    [3]   Past Medical History:   Allergic rhinitis    Cataract    Esophageal reflux    Hiatal hernia    Ovarian cancer (HCC)    s/p surgery alone (total abdominal hysterectomy with bilateral salpingo oopherectomy)    Visual impairment    readers   [4]   Past Surgical History:  Procedure Laterality Date    Colonoscopy  2017    Egd  2017    Hernia surgery      Hysterectomy      Other      removal of the ovaries    Other surgical history  2014    septoplasty , dr contreras     Other surgical history      deviated septum -- rhinoplasty    Total abdom hysterectomy     [5]   Family History  Problem Relation Age of Onset    Cancer Mother 57        cervical    Cancer Father 72        lung cancer;tobacco use    Cancer Sister 79        CLL    Crohn's Disease Daughter     Bleeding Disorders Neg     DVT/VTE Neg    [6]   Social History  Socioeconomic History    Marital status:    Tobacco Use    Smoking status: Never     Passive exposure: Never    Smokeless tobacco: Never   Vaping Use    Vaping status: Never Used   Substance and Sexual Activity    Alcohol use: No    Drug use: No   Other Topics Concern    Breast feeding No    Reaction to local anesthetic No    Pt has a pacemaker No    Pt has a defibrillator  No   Social History Narrative    Vijaya is  to Jamey. She has 3 adult children. Patient is retired from being a . She lives with her  in [place], [place].     Social Drivers of Health     Food Insecurity: No Food Insecurity (2/7/2024)    Food Insecurity     Food Insecurity: Never true   Transportation Needs: No Transportation Needs (2/7/2024)    Transportation Needs     Lack of Transportation: No   Housing Stability: Low Risk  (2/7/2024)    Housing Stability     Housing Instability: No

## 2025-06-18 ENCOUNTER — TELEPHONE (OUTPATIENT)
Dept: CASE MANAGEMENT | Age: 75
End: 2025-06-18

## 2025-06-18 DIAGNOSIS — M72.2 PLANTAR FASCIITIS: Primary | ICD-10-CM

## 2025-06-18 NOTE — TELEPHONE ENCOUNTER
Please notify patient I placed referral for her to see podiatrist Dr. Calderon she sees [facility] ;nathaly and at other locations

## 2025-06-18 NOTE — TELEPHONE ENCOUNTER
Dr. Chao,     Patient called requesting referral to a podiatrist for plantar fasciitis.     Please provide the name of the specialist you recommend on the referral.    Please have staff call patient with the name of the specialist you recommend.     Pended referral please review diagnosis and sign off if you agree.    Thank you.  Alesha Turner  [facility]

## 2025-06-24 ENCOUNTER — OFFICE VISIT (OUTPATIENT)
Dept: PODIATRY CLINIC | Facility: CLINIC | Age: 75
End: 2025-06-24

## 2025-06-24 VITALS — SYSTOLIC BLOOD PRESSURE: 150 MMHG | DIASTOLIC BLOOD PRESSURE: 80 MMHG

## 2025-06-24 DIAGNOSIS — M72.2 PLANTAR FASCIITIS: ICD-10-CM

## 2025-06-24 DIAGNOSIS — M77.31 CALCANEAL SPUR OF RIGHT FOOT: ICD-10-CM

## 2025-06-24 DIAGNOSIS — M72.2 PLANTAR FASCIITIS OF RIGHT FOOT: Primary | ICD-10-CM

## 2025-06-24 DIAGNOSIS — M79.671 INFLAMMATORY HEEL PAIN, RIGHT: ICD-10-CM

## 2025-06-24 PROCEDURE — 20550 NJX 1 TENDON SHEATH/LIGAMENT: CPT | Performed by: PODIATRIST

## 2025-06-24 PROCEDURE — 99203 OFFICE O/P NEW LOW 30 MIN: CPT | Performed by: PODIATRIST

## 2025-06-24 RX ADMIN — TRIAMCINOLONE ACETONIDE 40 MG: 40 INJECTION, SUSPENSION INTRA-ARTICULAR; INTRAMUSCULAR at 15:10:00

## 2025-06-25 RX ORDER — TRIAMCINOLONE ACETONIDE 40 MG/ML
40 INJECTION, SUSPENSION INTRA-ARTICULAR; INTRAMUSCULAR ONCE
Status: COMPLETED | OUTPATIENT
Start: 2025-06-25 | End: 2025-06-24

## 2025-06-26 ENCOUNTER — TELEPHONE (OUTPATIENT)
Dept: INTERNAL MEDICINE CLINIC | Facility: CLINIC | Age: 75
End: 2025-06-26

## 2025-06-26 NOTE — TELEPHONE ENCOUNTER
Pt is due for   Annual Medicare Physical  Dexa Scan  Blood pressure follow up  My chart message sent.

## 2025-06-27 NOTE — PROGRESS NOTES
Vijaya Shipley is a 75 year old female.   Chief Complaint   Patient presents with    New Patient     Right heel pain-pain 4/10 - pain is worse in the morning         HPI:   The patient presents to the clinic she has a chief complaint of a painful right heel pain can be very severe from time to time worst in the morning or getting up from prolonged nonweightbearing positions.  At today's visit reviewed nurse's history as taken above, allergies medications and medical history as documented below.  All changes duly noted  Allergies: Seasonal   Current Medications[1]   Past Medical History[2]   Past Surgical History[3]   Family History[4]   Social Hx on file[5]        REVIEW OF SYSTEMS:   Today reviewed systens as documented below  GENERAL HEALTH: feels well otherwise  SKIN: Refer to exam below  RESPIRATORY: denies shortness of breath with exertion  CARDIOVASCULAR: denies chest pain on exertion  GI: denies abdominal pain and denies heartburn  NEURO: denies headaches    EXAM:   /80 (BP Location: Left arm, Patient Position: Sitting, Cuff Size: adult)   GENERAL: well developed, well nourished, in no apparent distress  EXTREMITIES:   1. Integument: The skin on the right foot is warm and dry.   2. Vascular: Patient has palpable pulses dorsalis pedis posterior tibial   3. Neurologic: Patient has intact sensorium   4. Musculoskeletal: Patient has fairly severe pain on palpation of the plantar heel where the plantar fascia attaches.  X-rays were taken 2 views of the heel showing infracalcaneal spurring no fractures or dislocations are noted    ASSESSMENT AND PLAN:   Diagnoses and all orders for this visit:    Plantar fasciitis of right foot  -     triamcinolone acetonide (Kenalog-40) 40 MG/ML injection 40 mg    Inflammatory heel pain, right  -     triamcinolone acetonide (Kenalog-40) 40 MG/ML injection 40 mg    Calcaneal spur of right foot  -     triamcinolone acetonide (Kenalog-40) 40 MG/ML injection 40 mg    Other  orders  -     [facility] PODIATRY XR - RIGHT HEEL 2 VIEWS(CALCANEUS)        Plan: At today's visit I educated the patient about the problem of the mechanical irritation here.Today discussed the nature and extent of a cortisone injection as well as the possible complications and risks.  Patient was in agreement to receive the injection.  The patient was consented for a cortisone injection and after timeout was taken the injection consisting of 40 mg Kenalog and 1.0 cc of 0.5% Marcaine plain was injected into the symptomatic plantar fascia of the right heel using aseptic technique and appropriate approach.  A Band-Aid was applied to the injection site.   Patient should wear supportive athletic shoe she can get over-the-counter orthotics.  Will see her back in about 3 to 4 weeks dispensed stretching exercises so that she can do them about 3 times a day.  The patient indicates understanding of these issues and agrees to the plan.    Ar Ruiz DPM         [1]   Current Outpatient Medications   Medication Sig Dispense Refill    metoprolol succinate ER 25 MG Oral Tablet 24 Hr Take  1  1/2 tab po daily 135 tablet 3    famotidine 40 MG Oral Tab TAKE 1 TABLET BY MOUTH EVERY DAY 90 tablet 3    metoprolol succinate ER 25 MG Oral Tablet 24 Hr Take  1/2 tab po daily (Patient taking differently: Take 1 tablet (25 mg total) by mouth daily.) 45 tablet 3    PANTOPRAZOLE 40 MG Oral Tab EC TAKE 1 TABLET BY MOUTH EVERY DAY IN THE MORNING BEFORE BREAKFAST 90 tablet 3    calcium carbonate 500 MG Oral Chew Tab Chew 1 tablet (500 mg total) by mouth daily.      LEVOCETIRIZINE 5 MG Oral Tab TAKE 1 TABLET BY MOUTH EVERY DAY IN THE EVENING 90 tablet 1   [2]   Past Medical History:   Allergic rhinitis    Cataract    Esophageal reflux    Hiatal hernia    Ovarian cancer (HCC)    s/p surgery alone (total abdominal hysterectomy with bilateral salpingo oopherectomy)    Visual impairment    readers   [3]   Past Surgical History:  Procedure  Laterality Date    Colonoscopy  2017    Egd  2017    Hernia surgery      Hysterectomy      Other      removal of the ovaries    Other surgical history  2014    septoplasty , dr contreras     Other surgical history      deviated septum -- rhinoplasty    Total abdom hysterectomy     [4]   Family History  Problem Relation Age of Onset    Cancer Mother 57        cervical    Cancer Father 72        lung cancer;tobacco use    Cancer Sister 79        CLL    Crohn's Disease Daughter     Bleeding Disorders Neg     DVT/VTE Neg    [5]   Social History  Socioeconomic History    Marital status:    Tobacco Use    Smoking status: Never     Passive exposure: Never    Smokeless tobacco: Never   Vaping Use    Vaping status: Never Used   Substance and Sexual Activity    Alcohol use: No    Drug use: No   Other Topics Concern    Breast feeding No    Reaction to local anesthetic No    Pt has a pacemaker No    Pt has a defibrillator No

## 2025-07-16 ENCOUNTER — OFFICE VISIT (OUTPATIENT)
Dept: PODIATRY CLINIC | Facility: CLINIC | Age: 75
End: 2025-07-16

## 2025-07-16 DIAGNOSIS — M79.671 INFLAMMATORY HEEL PAIN, RIGHT: ICD-10-CM

## 2025-07-16 DIAGNOSIS — M72.2 PLANTAR FASCIITIS OF RIGHT FOOT: Primary | ICD-10-CM

## 2025-07-16 DIAGNOSIS — M77.31 CALCANEAL SPUR OF RIGHT FOOT: ICD-10-CM

## 2025-07-16 PROCEDURE — 99213 OFFICE O/P EST LOW 20 MIN: CPT | Performed by: PODIATRIST

## 2025-07-16 NOTE — PROGRESS NOTES
Vijaya Shipley is a 75 year old female.   Chief Complaint   Patient presents with    Heel Pain     Right Heel F/u - pain rated 2/10 - pt stated she has been doing exercises - has been using compression sleeves has helped         HPI:   Patient returns to clinic pain is only about 2 out of 10 she is doing a lot better she is doing her stretching exercises.  She is also getting some foot cramping at night.  She has obtained inserts and supportive shoes.  She is doing a lot better.  At today's visit reviewed nurse's history as taken above, allergies medications and medical history as documented below.  All changes duly noted  Allergies: Seasonal   Current Medications[1]   Past Medical History[2]   Past Surgical History[3]   Family History[4]   Social Hx on file[5]        REVIEW OF SYSTEMS:   Today reviewed systens as documented below  GENERAL HEALTH: feels well otherwise  SKIN: Refer to exam below  RESPIRATORY: denies shortness of breath with exertion  CARDIOVASCULAR: denies chest pain on exertion  GI: denies abdominal pain and denies heartburn  NEURO: denies headaches    EXAM:   There were no vitals taken for this visit.  GENERAL: well developed, well nourished, in no apparent distress  EXTREMITIES:   1. Integument: On her feet is intact   2. Vascular: Palpable pulses   3. Neurologic: Patient has intact sensorium there is no deficits noted   4. Musculoskeletal: Good muscle strength she is ambulatory minimal discomfort on palpation of the right heel.    ASSESSMENT AND PLAN:   Diagnoses and all orders for this visit:    Plantar fasciitis of right foot    Inflammatory heel pain, right    Calcaneal spur of right foot    Plantar fasciitis [M72.2]        Plan: Will continue with stretching exercises.  For the cramping she should do some stretching exercises before she goes to bed that would probably help loosen up and make the muscles more flexible so she does not get that.  She will follow-up as needed.    The patient  indicates understanding of these issues and agrees to the plan.    Ar Ruiz, LIZ       [1]   Current Outpatient Medications   Medication Sig Dispense Refill    metoprolol succinate ER 25 MG Oral Tablet 24 Hr Take  1  1/2 tab po daily 135 tablet 3    famotidine 40 MG Oral Tab TAKE 1 TABLET BY MOUTH EVERY DAY 90 tablet 3    metoprolol succinate ER 25 MG Oral Tablet 24 Hr Take  1/2 tab po daily (Patient taking differently: Take 1 tablet (25 mg total) by mouth daily.) 45 tablet 3    PANTOPRAZOLE 40 MG Oral Tab EC TAKE 1 TABLET BY MOUTH EVERY DAY IN THE MORNING BEFORE BREAKFAST 90 tablet 3    calcium carbonate 500 MG Oral Chew Tab Chew 1 tablet (500 mg total) by mouth daily.      LEVOCETIRIZINE 5 MG Oral Tab TAKE 1 TABLET BY MOUTH EVERY DAY IN THE EVENING 90 tablet 1   [2]   Past Medical History:   Allergic rhinitis    Cataract    Esophageal reflux    Hiatal hernia    Ovarian cancer (HCC)    s/p surgery alone (total abdominal hysterectomy with bilateral salpingo oopherectomy)    Visual impairment    readers   [3]   Past Surgical History:  Procedure Laterality Date    Colonoscopy  2017    Egd  2017    Hernia surgery      Hysterectomy      Other      removal of the ovaries    Other surgical history  2014    septoplasty , dr contreras     Other surgical history      deviated septum -- rhinoplasty    Total abdom hysterectomy     [4]   Family History  Problem Relation Age of Onset    Cancer Mother 57        cervical    Cancer Father 72        lung cancer;tobacco use    Cancer Sister 79        CLL    Crohn's Disease Daughter     Bleeding Disorders Neg     DVT/VTE Neg    [5]   Social History  Socioeconomic History    Marital status:    Tobacco Use    Smoking status: Never     Passive exposure: Never    Smokeless tobacco: Never   Vaping Use    Vaping status: Never Used   Substance and Sexual Activity    Alcohol use: No    Drug use: No   Other Topics Concern    Breast feeding No    Reaction to local anesthetic No     Pt has a pacemaker No    Pt has a defibrillator No

## 2025-07-30 ENCOUNTER — NURSE TRIAGE (OUTPATIENT)
Dept: INTERNAL MEDICINE CLINIC | Facility: CLINIC | Age: 75
End: 2025-07-30

## 2025-07-31 ENCOUNTER — OFFICE VISIT (OUTPATIENT)
Dept: INTERNAL MEDICINE CLINIC | Facility: CLINIC | Age: 75
End: 2025-07-31
Payer: MEDICARE

## 2025-07-31 VITALS
DIASTOLIC BLOOD PRESSURE: 79 MMHG | HEIGHT: 62 IN | BODY MASS INDEX: 25.21 KG/M2 | SYSTOLIC BLOOD PRESSURE: 136 MMHG | WEIGHT: 137 LBS | HEART RATE: 94 BPM | TEMPERATURE: 97 F | OXYGEN SATURATION: 99 %

## 2025-07-31 DIAGNOSIS — R05.1 ACUTE COUGH: Primary | ICD-10-CM

## 2025-07-31 DIAGNOSIS — H10.9 CONJUNCTIVITIS OF RIGHT EYE, UNSPECIFIED CONJUNCTIVITIS TYPE: ICD-10-CM

## 2025-07-31 PROCEDURE — 99214 OFFICE O/P EST MOD 30 MIN: CPT | Performed by: NURSE PRACTITIONER

## 2025-07-31 RX ORDER — BENZONATATE 200 MG/1
200 CAPSULE ORAL 3 TIMES DAILY PRN
Qty: 30 CAPSULE | Refills: 0 | Status: SHIPPED | OUTPATIENT
Start: 2025-07-31

## 2025-07-31 RX ORDER — AZITHROMYCIN 250 MG/1
TABLET, FILM COATED ORAL
Qty: 6 TABLET | Refills: 0 | Status: SHIPPED | OUTPATIENT
Start: 2025-07-31 | End: 2025-08-05

## 2025-07-31 RX ORDER — TOBRAMYCIN 3 MG/ML
2 SOLUTION/ DROPS OPHTHALMIC EVERY 4 HOURS
Qty: 5 ML | Refills: 0 | Status: SHIPPED | OUTPATIENT
Start: 2025-07-31 | End: 2025-08-07

## (undated) DEVICE — SUT COAT VCRL 3-0 27IN SH ABSRB UD 26MM 1/2

## (undated) DEVICE — LAP CHOLE: Brand: MEDLINE INDUSTRIES, INC.

## (undated) DEVICE — ENDOSCOPY PACK UPPER: Brand: MEDLINE INDUSTRIES, INC.

## (undated) DEVICE — SUTURE VICRYL 0 J906G

## (undated) DEVICE — ENSEAL X1 TISSUE SEALER, CURVED JAW, 37 CM SHAFT LENGTH: Brand: ENSEAL

## (undated) DEVICE — Device: Brand: SUTURE PASSOR PRO

## (undated) DEVICE — PENROSE DRAIN 12" X 1/4: Brand: CARDINAL HEALTH

## (undated) DEVICE — LEGGINGS SURG W31XL48IN SMS FAB SFT

## (undated) DEVICE — TROCAR: Brand: KII FIOS FIRST ENTRY

## (undated) DEVICE — ABSORBABLE WOUND CLOSURE DEVICE: Brand: V-LOC 90

## (undated) DEVICE — LAPAROSCOPIC TROCAR SLEEVE/SINGLE USE: Brand: KII® SLEEVE

## (undated) DEVICE — DISPOSABLE SUCTION/IRRIGATOR TUBE SET: Brand: AHTO

## (undated) DEVICE — SOLUTION IRRIG 1000ML 0.9% NACL USP BTL

## (undated) DEVICE — [HIGH FLOW INSUFFLATOR,  DO NOT USE IF PACKAGE IS DAMAGED,  KEEP DRY,  KEEP AWAY FROM SUNLIGHT,  PROTECT FROM HEAT AND RADIOACTIVE SOURCES.]: Brand: PNEUMOSURE

## (undated) DEVICE — DISPOSABLE GRASPER CARTRIDGE: Brand: DIRECT DRIVE REPOSABLE GRASPERS

## (undated) DEVICE — POLYESTER SINGLE STITCH RELOAD: Brand: SURGIDAC

## (undated) DEVICE — BINDER ABD L/XL H12XL62IN 4 PNL UNIV PREM

## (undated) DEVICE — SOLUTION IRRIG 3000ML 0.9% NACL FLX CONT

## (undated) DEVICE — PENROSE TUBING RADIOPAQUE: Brand: ARGYLE

## (undated) DEVICE — SUT PERMA- 3-0 30IN SH NABSRB BLK 26MM 1/2

## (undated) DEVICE — SUTURING DEVICE: Brand: ENDO STITCH

## (undated) DEVICE — UNDYED BRAIDED (POLYGLACTIN 910), SYNTHETIC ABSORBABLE SUTURE: Brand: COATED VICRYL

## (undated) DEVICE — CANNULA NASAL 02/C02 ADULT

## (undated) DEVICE — TROCAR: Brand: KII® SLEEVE

## (undated) DEVICE — Device: Brand: BALLOON3

## (undated) DEVICE — GAMMEX® PI HYBRID SIZE 7.5, STERILE POWDER-FREE SURGICAL GLOVE, POLYISOPRENE AND NEOPRENE BLEND: Brand: GAMMEX

## (undated) DEVICE — ELECTRODE ES 2.75IN PTFE BLDE MOD E-Z CLN

## (undated) DEVICE — Device: Brand: DEFENDO AIR/WATER/SUCTION AND BIOPSY VALVE

## (undated) DEVICE — DRAPE SHEET LG

## (undated) DEVICE — OR TOWEL, 17" X 26" STERILE, BLUE: Brand: PREMIERPRO

## (undated) NOTE — LETTER
02/21/19        [address] [address]      Dear Albina Etta,    [facility] records indicate that you have outstanding lab work and or testing that was ordered for you and has not yet been completed:  Orders Placed This Encounte

## (undated) NOTE — LETTER
4/19/2019              [address]         Dear Sunshine Gaytan,      It was a pleasure to see you at our [facility], [address] [facility], [address] office.   Your recent mammogram were stable, repeat in 1

## (undated) NOTE — LETTER
4/13/2018              [id number]        [address]         Dear Sharron Hernandes,      It was a pleasure to see you at our [facility], [address] [facility], [address] office. Your recent Mammogram are normal. time.   I lo

## (undated) NOTE — LETTER
AUTHORIZATION FOR SURGICAL OPERATION OR OTHER PROCEDURE    1. I hereby authorize Dr. Ar Ruiz, and [facility] staff assigned to my case to perform the following operation and/or procedure at the [facility] site:    _______________________________________________________________________________________________    Cortisone Injection Right Heel  _______________________________________________________________________________________________    2.  My physician has explained the nature and purpose of the operation or other procedure, possible alternative methods of treatment, the risks involved, and the possibility of complication to me.  I acknowledge that no guarantee has been made as to the result that may be obtained.  3.  I recognize that, during the course of this operation, or other procedure, unforseen conditions may necessitate additional or different procedure than those listed above.  I, therefore, further authorize and request that the above named physician, his/her physician assistants or designees perform such procedures as are, in his/her professional opinion, necessary and desirable.  4.  Any tissue or organs removed in the operation or other procedure may be disposed of by and at the discretion of the [facility] and [facility].  5.  I understand that in the event of a medical emergency, I will be transported by local paramedics to [facility] or other hospital emergency department.  6.  I certify that I have read and fully understand the above consent to operation and/or other procedure.    7.  I acknowledge that my physician has explained sedation/analgesia administration to me including the risks and benefits.  I consent to the administration of sedation/analgesia as may be necessary or desirable in the judgement of my physician.    Witness signature: ___________________________________________________ Date:   ______/______/_____                    Time:  ________ A.M.  P.M.       Patient Name:  ______________________________________________________  (please print)      Patient signature:  ___________________________________________________             Relationship to Patient:           []  Parent    Responsible person                          []  Spouse  In case of minor or                    [] Other  _____________   Incompetent name:  __________________________________________________                               (please print)      _____________      Responsible person  In case of minor or  Incompetent signature:  _______________________________________________    Statement of Physician  My signature below affirms that prior to the time of the procedure, I have explained to the patient and/or his/her guardian, the risks and benefits involved in the proposed treatment and any reasonable alternative to the proposed treatment.  I have also explained the risks and benefits involved in the refusal of the proposed treatment and have answered the patient's questions.                        Date:  ______/______/_______  Provider                      Signature:  __________________________________________________________       Time:  ___________ A.M    P.M.